# Patient Record
Sex: FEMALE | Race: ASIAN | NOT HISPANIC OR LATINO
[De-identification: names, ages, dates, MRNs, and addresses within clinical notes are randomized per-mention and may not be internally consistent; named-entity substitution may affect disease eponyms.]

---

## 2017-01-10 ENCOUNTER — APPOINTMENT (OUTPATIENT)
Dept: OBGYN | Facility: CLINIC | Age: 52
End: 2017-01-10

## 2017-01-10 VITALS
WEIGHT: 121 LBS | SYSTOLIC BLOOD PRESSURE: 146 MMHG | DIASTOLIC BLOOD PRESSURE: 98 MMHG | HEIGHT: 62 IN | BODY MASS INDEX: 22.26 KG/M2 | HEART RATE: 66 BPM

## 2017-01-19 LAB
CYTOLOGY CVX/VAG DOC THIN PREP: NORMAL
ESTRADIOL SERPL-MCNC: <5 PG/ML
FSH SERPL-MCNC: 58.6 IU/L
HPV HIGH+LOW RISK DNA PNL CVX: NEGATIVE
PROLACTIN SERPL-MCNC: 13.8 NG/ML
TSH SERPL-ACNC: 1.97 UIU/ML

## 2017-05-30 ENCOUNTER — APPOINTMENT (OUTPATIENT)
Dept: OBGYN | Facility: CLINIC | Age: 52
End: 2017-05-30

## 2017-05-30 VITALS
SYSTOLIC BLOOD PRESSURE: 159 MMHG | HEART RATE: 54 BPM | WEIGHT: 117.19 LBS | HEIGHT: 62 IN | BODY MASS INDEX: 21.57 KG/M2 | DIASTOLIC BLOOD PRESSURE: 90 MMHG

## 2017-05-31 LAB
ALBUMIN SERPL ELPH-MCNC: 4.6 G/DL
ALP BLD-CCNC: 93 U/L
ALT SERPL-CCNC: 16 U/L
ANION GAP SERPL CALC-SCNC: 15 MMOL/L
AST SERPL-CCNC: 24 U/L
BASOPHILS # BLD AUTO: 0.02 K/UL
BASOPHILS NFR BLD AUTO: 0.3 %
BILIRUB SERPL-MCNC: 0.4 MG/DL
BUN SERPL-MCNC: 11 MG/DL
CALCIUM SERPL-MCNC: 10 MG/DL
CHLORIDE SERPL-SCNC: 101 MMOL/L
CO2 SERPL-SCNC: 25 MMOL/L
CREAT SERPL-MCNC: 0.82 MG/DL
EOSINOPHIL # BLD AUTO: 0.19 K/UL
EOSINOPHIL NFR BLD AUTO: 3.3 %
GLUCOSE SERPL-MCNC: 71 MG/DL
HBA1C MFR BLD HPLC: 5.4 %
HCT VFR BLD CALC: 43.5 %
HGB BLD-MCNC: 14.2 G/DL
IMM GRANULOCYTES NFR BLD AUTO: 0.2 %
LYMPHOCYTES # BLD AUTO: 2.17 K/UL
LYMPHOCYTES NFR BLD AUTO: 37.7 %
MAN DIFF?: NORMAL
MCHC RBC-ENTMCNC: 29.8 PG
MCHC RBC-ENTMCNC: 32.6 GM/DL
MCV RBC AUTO: 91.2 FL
MONOCYTES # BLD AUTO: 0.33 K/UL
MONOCYTES NFR BLD AUTO: 5.7 %
NEUTROPHILS # BLD AUTO: 3.04 K/UL
NEUTROPHILS NFR BLD AUTO: 52.8 %
PLATELET # BLD AUTO: 281 K/UL
POTASSIUM SERPL-SCNC: 5.8 MMOL/L
PROT SERPL-MCNC: 7.6 G/DL
RBC # BLD: 4.77 M/UL
RBC # FLD: 12.6 %
SODIUM SERPL-SCNC: 141 MMOL/L
WBC # FLD AUTO: 5.76 K/UL

## 2017-06-07 ENCOUNTER — APPOINTMENT (OUTPATIENT)
Dept: ULTRASOUND IMAGING | Facility: IMAGING CENTER | Age: 52
End: 2017-06-07

## 2017-06-07 ENCOUNTER — APPOINTMENT (OUTPATIENT)
Dept: MAMMOGRAPHY | Facility: IMAGING CENTER | Age: 52
End: 2017-06-07

## 2017-06-07 ENCOUNTER — OUTPATIENT (OUTPATIENT)
Dept: OUTPATIENT SERVICES | Facility: HOSPITAL | Age: 52
LOS: 1 days | End: 2017-06-07
Payer: COMMERCIAL

## 2017-06-07 DIAGNOSIS — Z12.39 ENCOUNTER FOR OTHER SCREENING FOR MALIGNANT NEOPLASM OF BREAST: ICD-10-CM

## 2017-06-07 DIAGNOSIS — S62.102A FRACTURE OF UNSPECIFIED CARPAL BONE, LEFT WRIST, INITIAL ENCOUNTER FOR CLOSED FRACTURE: Chronic | ICD-10-CM

## 2017-06-07 PROCEDURE — 77067 SCR MAMMO BI INCL CAD: CPT

## 2017-06-07 PROCEDURE — 77063 BREAST TOMOSYNTHESIS BI: CPT

## 2017-06-07 PROCEDURE — 76641 ULTRASOUND BREAST COMPLETE: CPT

## 2018-01-23 ENCOUNTER — APPOINTMENT (OUTPATIENT)
Dept: OBGYN | Facility: CLINIC | Age: 53
End: 2018-01-23
Payer: COMMERCIAL

## 2018-01-23 VITALS
BODY MASS INDEX: 21.71 KG/M2 | DIASTOLIC BLOOD PRESSURE: 92 MMHG | HEIGHT: 62 IN | WEIGHT: 118 LBS | HEART RATE: 56 BPM | SYSTOLIC BLOOD PRESSURE: 149 MMHG

## 2018-01-23 DIAGNOSIS — Z87.42 PERSONAL HISTORY OF OTHER DISEASES OF THE FEMALE GENITAL TRACT: ICD-10-CM

## 2018-01-23 DIAGNOSIS — L60.3 NAIL DYSTROPHY: ICD-10-CM

## 2018-01-23 DIAGNOSIS — N95.1 MENOPAUSAL AND FEMALE CLIMACTERIC STATES: ICD-10-CM

## 2018-01-23 DIAGNOSIS — Z87.448 PERSONAL HISTORY OF OTHER DISEASES OF URINARY SYSTEM: ICD-10-CM

## 2018-01-23 DIAGNOSIS — K13.0 DISEASES OF LIPS: ICD-10-CM

## 2018-01-23 DIAGNOSIS — N32.81 OVERACTIVE BLADDER: ICD-10-CM

## 2018-01-23 DIAGNOSIS — N76.0 ACUTE VAGINITIS: ICD-10-CM

## 2018-01-23 DIAGNOSIS — B96.89 ACUTE VAGINITIS: ICD-10-CM

## 2018-01-23 DIAGNOSIS — N39.41 URGE INCONTINENCE: ICD-10-CM

## 2018-01-23 DIAGNOSIS — R93.8 ABNORMAL FINDINGS ON DIAGNOSTIC IMAGING OF OTHER SPECIFIED BODY STRUCTURES: ICD-10-CM

## 2018-01-23 DIAGNOSIS — R92.2 INCONCLUSIVE MAMMOGRAM: ICD-10-CM

## 2018-01-23 DIAGNOSIS — Z01.419 ENCOUNTER FOR GYNECOLOGICAL EXAMINATION (GENERAL) (ROUTINE) W/OUT ABNORMAL FINDINGS: ICD-10-CM

## 2018-01-23 DIAGNOSIS — T14.8XXA OTHER INJURY OF UNSPECIFIED BODY REGION, INITIAL ENCOUNTER: ICD-10-CM

## 2018-01-23 DIAGNOSIS — R36.0 URETHRAL DISCHARGE W/OUT BLOOD: ICD-10-CM

## 2018-01-23 DIAGNOSIS — M62.89 OTHER SPECIFIED DISORDERS OF MUSCLE: ICD-10-CM

## 2018-01-23 DIAGNOSIS — Z86.03 PERSONAL HISTORY OF NEOPLASM OF UNCERTAIN BEHAVIOR: ICD-10-CM

## 2018-01-23 PROCEDURE — 99396 PREV VISIT EST AGE 40-64: CPT

## 2018-01-25 LAB
CANDIDA VAG CYTO: NOT DETECTED
G VAGINALIS+PREV SP MTYP VAG QL MICRO: NOT DETECTED
T VAGINALIS VAG QL WET PREP: NOT DETECTED

## 2018-01-30 ENCOUNTER — OTHER (OUTPATIENT)
Age: 53
End: 2018-01-30

## 2018-03-20 ENCOUNTER — APPOINTMENT (OUTPATIENT)
Dept: GASTROENTEROLOGY | Facility: CLINIC | Age: 53
End: 2018-03-20

## 2018-04-03 ENCOUNTER — APPOINTMENT (OUTPATIENT)
Dept: OBGYN | Facility: CLINIC | Age: 53
End: 2018-04-03

## 2018-07-03 ENCOUNTER — APPOINTMENT (OUTPATIENT)
Dept: ULTRASOUND IMAGING | Facility: IMAGING CENTER | Age: 53
End: 2018-07-03
Payer: COMMERCIAL

## 2018-07-03 ENCOUNTER — APPOINTMENT (OUTPATIENT)
Dept: MAMMOGRAPHY | Facility: IMAGING CENTER | Age: 53
End: 2018-07-03
Payer: COMMERCIAL

## 2018-07-03 ENCOUNTER — OUTPATIENT (OUTPATIENT)
Dept: OUTPATIENT SERVICES | Facility: HOSPITAL | Age: 53
LOS: 1 days | End: 2018-07-03
Payer: COMMERCIAL

## 2018-07-03 DIAGNOSIS — R92.8 OTHER ABNORMAL AND INCONCLUSIVE FINDINGS ON DIAGNOSTIC IMAGING OF BREAST: ICD-10-CM

## 2018-07-03 DIAGNOSIS — R92.2 INCONCLUSIVE MAMMOGRAM: ICD-10-CM

## 2018-07-03 DIAGNOSIS — S62.102A FRACTURE OF UNSPECIFIED CARPAL BONE, LEFT WRIST, INITIAL ENCOUNTER FOR CLOSED FRACTURE: Chronic | ICD-10-CM

## 2018-07-03 DIAGNOSIS — Z12.31 ENCOUNTER FOR SCREENING MAMMOGRAM FOR MALIGNANT NEOPLASM OF BREAST: ICD-10-CM

## 2018-07-03 PROCEDURE — 77067 SCR MAMMO BI INCL CAD: CPT

## 2018-07-03 PROCEDURE — 76641 ULTRASOUND BREAST COMPLETE: CPT | Mod: 26,50

## 2018-07-03 PROCEDURE — 77063 BREAST TOMOSYNTHESIS BI: CPT | Mod: 26

## 2018-07-03 PROCEDURE — 77067 SCR MAMMO BI INCL CAD: CPT | Mod: 26

## 2018-07-03 PROCEDURE — 76641 ULTRASOUND BREAST COMPLETE: CPT

## 2018-07-03 PROCEDURE — 77063 BREAST TOMOSYNTHESIS BI: CPT

## 2018-10-04 ENCOUNTER — APPOINTMENT (OUTPATIENT)
Dept: OBGYN | Facility: CLINIC | Age: 53
End: 2018-10-04
Payer: COMMERCIAL

## 2018-10-04 VITALS
SYSTOLIC BLOOD PRESSURE: 131 MMHG | BODY MASS INDEX: 22.08 KG/M2 | WEIGHT: 120 LBS | DIASTOLIC BLOOD PRESSURE: 87 MMHG | HEART RATE: 61 BPM | HEIGHT: 62 IN

## 2018-10-04 DIAGNOSIS — N76.2 ACUTE VULVITIS: ICD-10-CM

## 2018-10-04 PROCEDURE — 99213 OFFICE O/P EST LOW 20 MIN: CPT

## 2018-10-08 ENCOUNTER — APPOINTMENT (OUTPATIENT)
Dept: RADIOLOGY | Facility: IMAGING CENTER | Age: 53
End: 2018-10-08

## 2018-10-11 ENCOUNTER — APPOINTMENT (OUTPATIENT)
Dept: NEUROLOGY | Facility: CLINIC | Age: 53
End: 2018-10-11

## 2018-10-16 ENCOUNTER — APPOINTMENT (OUTPATIENT)
Dept: PULMONOLOGY | Facility: CLINIC | Age: 53
End: 2018-10-16

## 2018-10-22 ENCOUNTER — APPOINTMENT (OUTPATIENT)
Dept: PULMONOLOGY | Facility: CLINIC | Age: 53
End: 2018-10-22

## 2018-10-30 ENCOUNTER — OUTPATIENT (OUTPATIENT)
Dept: OUTPATIENT SERVICES | Facility: HOSPITAL | Age: 53
LOS: 1 days | End: 2018-10-30

## 2018-10-30 DIAGNOSIS — S62.102A FRACTURE OF UNSPECIFIED CARPAL BONE, LEFT WRIST, INITIAL ENCOUNTER FOR CLOSED FRACTURE: Chronic | ICD-10-CM

## 2018-10-31 ENCOUNTER — APPOINTMENT (OUTPATIENT)
Dept: MAMMOGRAPHY | Facility: CLINIC | Age: 53
End: 2018-10-31
Payer: COMMERCIAL

## 2018-10-31 ENCOUNTER — APPOINTMENT (OUTPATIENT)
Dept: ULTRASOUND IMAGING | Facility: CLINIC | Age: 53
End: 2018-10-31
Payer: COMMERCIAL

## 2018-10-31 ENCOUNTER — APPOINTMENT (OUTPATIENT)
Dept: RADIOLOGY | Facility: CLINIC | Age: 53
End: 2018-10-31
Payer: COMMERCIAL

## 2018-10-31 ENCOUNTER — OUTPATIENT (OUTPATIENT)
Dept: OUTPATIENT SERVICES | Facility: HOSPITAL | Age: 53
LOS: 1 days | End: 2018-10-31

## 2018-10-31 DIAGNOSIS — S62.102A FRACTURE OF UNSPECIFIED CARPAL BONE, LEFT WRIST, INITIAL ENCOUNTER FOR CLOSED FRACTURE: Chronic | ICD-10-CM

## 2018-10-31 PROCEDURE — 77065 DX MAMMO INCL CAD UNI: CPT | Mod: 26,LT

## 2018-10-31 PROCEDURE — 76641 ULTRASOUND BREAST COMPLETE: CPT | Mod: 26,LT

## 2018-10-31 PROCEDURE — 71048 X-RAY EXAM CHEST 4+ VIEWS: CPT | Mod: 26

## 2018-10-31 PROCEDURE — G0279: CPT | Mod: 26

## 2018-11-02 ENCOUNTER — APPOINTMENT (OUTPATIENT)
Dept: OBGYN | Facility: CLINIC | Age: 53
End: 2018-11-02
Payer: COMMERCIAL

## 2018-11-02 VITALS
DIASTOLIC BLOOD PRESSURE: 81 MMHG | WEIGHT: 122.38 LBS | HEART RATE: 55 BPM | SYSTOLIC BLOOD PRESSURE: 114 MMHG | BODY MASS INDEX: 22.52 KG/M2 | HEIGHT: 62 IN

## 2018-11-02 DIAGNOSIS — R92.8 OTHER ABNORMAL AND INCONCLUSIVE FINDINGS ON DIAGNOSTIC IMAGING OF BREAST: ICD-10-CM

## 2018-11-02 DIAGNOSIS — Z87.828 PERSONAL HISTORY OF OTHER (HEALED) PHYSICAL INJURY AND TRAUMA: ICD-10-CM

## 2018-11-02 PROCEDURE — 99213 OFFICE O/P EST LOW 20 MIN: CPT

## 2018-11-06 ENCOUNTER — APPOINTMENT (OUTPATIENT)
Dept: ULTRASOUND IMAGING | Facility: CLINIC | Age: 53
End: 2018-11-06
Payer: COMMERCIAL

## 2018-11-06 ENCOUNTER — RESULT REVIEW (OUTPATIENT)
Age: 53
End: 2018-11-06

## 2018-11-06 ENCOUNTER — OUTPATIENT (OUTPATIENT)
Dept: OUTPATIENT SERVICES | Facility: HOSPITAL | Age: 53
LOS: 1 days | End: 2018-11-06

## 2018-11-06 DIAGNOSIS — S62.102A FRACTURE OF UNSPECIFIED CARPAL BONE, LEFT WRIST, INITIAL ENCOUNTER FOR CLOSED FRACTURE: Chronic | ICD-10-CM

## 2018-11-06 PROCEDURE — 19083 BX BREAST 1ST LESION US IMAG: CPT | Mod: LT

## 2018-11-06 PROCEDURE — 77065 DX MAMMO INCL CAD UNI: CPT | Mod: 26,LT

## 2018-11-07 LAB — SURGICAL PATHOLOGY STUDY: SIGNIFICANT CHANGE UP

## 2018-11-15 ENCOUNTER — APPOINTMENT (OUTPATIENT)
Dept: BREAST CENTER | Facility: CLINIC | Age: 53
End: 2018-11-15
Payer: COMMERCIAL

## 2018-11-15 VITALS
WEIGHT: 122 LBS | HEART RATE: 64 BPM | TEMPERATURE: 98.4 F | HEIGHT: 62 IN | SYSTOLIC BLOOD PRESSURE: 120 MMHG | BODY MASS INDEX: 22.45 KG/M2 | OXYGEN SATURATION: 95 % | DIASTOLIC BLOOD PRESSURE: 81 MMHG

## 2018-11-15 DIAGNOSIS — D24.2 BENIGN NEOPLASM OF LEFT BREAST: ICD-10-CM

## 2018-11-15 PROCEDURE — 99243 OFF/OP CNSLTJ NEW/EST LOW 30: CPT

## 2019-01-02 ENCOUNTER — FORM ENCOUNTER (OUTPATIENT)
Age: 54
End: 2019-01-02

## 2019-01-02 PROBLEM — Z87.828 HISTORY OF TRAUMA OF BREAST: Status: ACTIVE | Noted: 2018-10-04

## 2019-01-03 ENCOUNTER — APPOINTMENT (OUTPATIENT)
Dept: ULTRASOUND IMAGING | Facility: CLINIC | Age: 54
End: 2019-01-03
Payer: COMMERCIAL

## 2019-01-03 ENCOUNTER — OUTPATIENT (OUTPATIENT)
Dept: OUTPATIENT SERVICES | Facility: HOSPITAL | Age: 54
LOS: 1 days | End: 2019-01-03

## 2019-01-03 ENCOUNTER — APPOINTMENT (OUTPATIENT)
Dept: MAMMOGRAPHY | Facility: CLINIC | Age: 54
End: 2019-01-03
Payer: COMMERCIAL

## 2019-01-03 ENCOUNTER — RESULT REVIEW (OUTPATIENT)
Age: 54
End: 2019-01-03

## 2019-01-03 DIAGNOSIS — S62.102A FRACTURE OF UNSPECIFIED CARPAL BONE, LEFT WRIST, INITIAL ENCOUNTER FOR CLOSED FRACTURE: Chronic | ICD-10-CM

## 2019-01-03 PROCEDURE — 19285 PERQ DEV BREAST 1ST US IMAG: CPT | Mod: LT

## 2019-01-03 PROCEDURE — 77065 DX MAMMO INCL CAD UNI: CPT | Mod: 26,LT

## 2019-01-03 PROCEDURE — 76098 X-RAY EXAM SURGICAL SPECIMEN: CPT | Mod: 26

## 2019-01-08 LAB — SURGICAL PATHOLOGY STUDY: SIGNIFICANT CHANGE UP

## 2019-01-09 ENCOUNTER — CHART COPY (OUTPATIENT)
Age: 54
End: 2019-01-09

## 2019-01-11 ENCOUNTER — APPOINTMENT (OUTPATIENT)
Dept: BREAST CENTER | Facility: CLINIC | Age: 54
End: 2019-01-11
Payer: COMMERCIAL

## 2019-01-11 VITALS
DIASTOLIC BLOOD PRESSURE: 68 MMHG | BODY MASS INDEX: 22.45 KG/M2 | HEIGHT: 62 IN | SYSTOLIC BLOOD PRESSURE: 115 MMHG | WEIGHT: 122 LBS | HEART RATE: 60 BPM

## 2019-01-11 PROCEDURE — 99024 POSTOP FOLLOW-UP VISIT: CPT

## 2019-01-11 NOTE — HISTORY OF PRESENT ILLNESS
[FreeTextEntry1] : Patient presents for post op she is s/p left wide excision for sclerosing intraductal papilloma on 1/3/19 final pathology revealed LCIS, surrounding tissue with columnar cell changes with and without atypia, atpical lobular hyperplasia, UDH, sclerosing adenosis and apocrine metaplasia, biopsy site changes, calcs associated with benign epithelium. She is doing well, incision C/D/I.

## 2019-01-11 NOTE — CONSULT LETTER
[Dear  ___] : Dear  [unfilled], [Consult Letter:] : I had the pleasure of evaluating your patient, [unfilled]. [Please see my note below.] : Please see my note below. [Consult Closing:] : Thank you very much for allowing me to participate in the care of this patient.  If you have any questions, please do not hesitate to contact me. [Sincerely,] : Sincerely, [FreeTextEntry2] : Andrew Palma MD [FreeTextEntry3] : Jeanette Heller MD FACS\par

## 2019-07-08 ENCOUNTER — APPOINTMENT (OUTPATIENT)
Dept: MAMMOGRAPHY | Facility: CLINIC | Age: 54
End: 2019-07-08
Payer: COMMERCIAL

## 2019-07-08 ENCOUNTER — APPOINTMENT (OUTPATIENT)
Dept: ULTRASOUND IMAGING | Facility: CLINIC | Age: 54
End: 2019-07-08

## 2019-07-08 ENCOUNTER — OUTPATIENT (OUTPATIENT)
Dept: OUTPATIENT SERVICES | Facility: HOSPITAL | Age: 54
LOS: 1 days | End: 2019-07-08

## 2019-07-08 DIAGNOSIS — S62.102A FRACTURE OF UNSPECIFIED CARPAL BONE, LEFT WRIST, INITIAL ENCOUNTER FOR CLOSED FRACTURE: Chronic | ICD-10-CM

## 2019-07-08 PROCEDURE — 77063 BREAST TOMOSYNTHESIS BI: CPT | Mod: 26

## 2019-07-08 PROCEDURE — 77067 SCR MAMMO BI INCL CAD: CPT | Mod: 26

## 2019-07-08 PROCEDURE — 76641 ULTRASOUND BREAST COMPLETE: CPT | Mod: 26,50

## 2019-07-25 ENCOUNTER — APPOINTMENT (OUTPATIENT)
Dept: SURGERY | Facility: CLINIC | Age: 54
End: 2019-07-25
Payer: COMMERCIAL

## 2019-07-25 VITALS
DIASTOLIC BLOOD PRESSURE: 66 MMHG | BODY MASS INDEX: 21.53 KG/M2 | WEIGHT: 117 LBS | SYSTOLIC BLOOD PRESSURE: 113 MMHG | HEART RATE: 56 BPM | HEIGHT: 62 IN

## 2019-07-25 PROCEDURE — 99213 OFFICE O/P EST LOW 20 MIN: CPT

## 2019-07-25 PROCEDURE — 99203 OFFICE O/P NEW LOW 30 MIN: CPT

## 2019-07-25 RX ORDER — TRIAMCINOLONE ACETONIDE 1 MG/G
0.1 CREAM TOPICAL TWICE DAILY
Qty: 1 | Refills: 0 | Status: DISCONTINUED | COMMUNITY
Start: 2018-01-23 | End: 2019-07-25

## 2019-07-26 NOTE — PAST MEDICAL HISTORY
[Postmenopausal] : The patient is postmenopausal [Menarche Age ____] : age at menarche was [unfilled] [Menopause Age____] : age at menopause was [unfilled] [Approximately ___] : the LMP was approximately [unfilled] [Total Preg ___] : G[unfilled] [Live Births ___] : P[unfilled]  [Living ___] : Living: [unfilled] [Age At Live Birth ___] : Age at live birth: [unfilled] [AB Spont ___] : miscarriages: [unfilled]  [History of Hormone Replacement Treatment] : has no history of hormone replacement treatment [FreeTextEntry5] :  1995\par Hysteroscopy w/ resection for intrauterine polyp removal\par Tubal Ligation-  [FreeTextEntry6] : None [FreeTextEntry7] : Depo x 2 years, stopped in 1998 [FreeTextEntry8] : Breasfed x 5 months with each child

## 2019-07-26 NOTE — PHYSICAL EXAM
[Normocephalic] : normocephalic [Atraumatic] : atraumatic [Supple] : supple [No Supraclavicular Adenopathy] : no supraclavicular adenopathy [No dominant masses] : no dominant masses in right breast  [No dominant masses] : no dominant masses left breast [Examined in the supine and seated position] : examined in the supine and seated position [No Nipple Retraction] : no right nipple retraction [No Nipple Discharge] : no left nipple discharge [No Axillary Lymphadenopathy] : no left axillary lymphadenopathy [No Edema] : no edema [No Rashes] : no rashes [No Ulceration] : no ulceration

## 2019-07-26 NOTE — HISTORY OF PRESENT ILLNESS
[FreeTextEntry1] : Samuel is a 54 year old female previously under the care of Dr. Jeanette Heller presents for follow up. She was last evaluated in the office in 1/2019. She has h/o left breast LCIS and is s/p left excisional biopsy on 1/3/19. \par Pt denies masses, lesions, discharge, or other breast complaints. \par \par Pt did not see med onc for risk reduction therapy, because she said she did not know who to go to. Reassured patient that she will be referred to a medical oncologist today. Discussed the patient's high risk status due to history of LCIS. MILENA 49%. Also, discussed benefit of MRI and close monitoring for her high risk status. Patient understood and is willing to go forward with both. Patient understands that due to her family history of ovarian cancer she would also benefit from genetic testing as she has children and siblings.\par \par On 7/8/19 she completed her b/l mammo/US showing no mammographic or sonographic evidence of malignancy, BIRADS 2 benign findings. Has not had an MRI \par \par Family history is positive for ovarian cancer (m-aunt dx 50); she reports that her sister "might" have breast cancer, but is unsure; she knows that her sister has had multiple surgeries on her breasts, but her sister has not been forthcoming about what the actual diagnosis is. No genetic testing has been completed.

## 2019-08-15 ENCOUNTER — APPOINTMENT (OUTPATIENT)
Dept: MRI IMAGING | Facility: HOSPITAL | Age: 54
End: 2019-08-15
Payer: COMMERCIAL

## 2019-08-15 ENCOUNTER — OUTPATIENT (OUTPATIENT)
Dept: OUTPATIENT SERVICES | Facility: HOSPITAL | Age: 54
LOS: 1 days | End: 2019-08-15
Payer: COMMERCIAL

## 2019-08-15 DIAGNOSIS — S62.102A FRACTURE OF UNSPECIFIED CARPAL BONE, LEFT WRIST, INITIAL ENCOUNTER FOR CLOSED FRACTURE: Chronic | ICD-10-CM

## 2019-08-15 PROCEDURE — C8937: CPT

## 2019-08-15 PROCEDURE — 77049 MRI BREAST C-+ W/CAD BI: CPT | Mod: 26

## 2019-08-15 PROCEDURE — A9585: CPT

## 2019-08-15 PROCEDURE — 77049 MRI BREAST C-+ W/CAD BI: CPT

## 2019-08-22 DIAGNOSIS — N63.10 UNSPECIFIED LUMP IN THE RIGHT BREAST, UNSPECIFIED QUADRANT: ICD-10-CM

## 2019-08-22 DIAGNOSIS — R92.8 OTHER ABNORMAL AND INCONCLUSIVE FINDINGS ON DIAGNOSTIC IMAGING OF BREAST: ICD-10-CM

## 2019-08-23 ENCOUNTER — OUTPATIENT (OUTPATIENT)
Dept: OUTPATIENT SERVICES | Facility: HOSPITAL | Age: 54
LOS: 1 days | End: 2019-08-23
Payer: COMMERCIAL

## 2019-08-23 DIAGNOSIS — S62.102A FRACTURE OF UNSPECIFIED CARPAL BONE, LEFT WRIST, INITIAL ENCOUNTER FOR CLOSED FRACTURE: Chronic | ICD-10-CM

## 2019-08-23 PROCEDURE — 76642 ULTRASOUND BREAST LIMITED: CPT

## 2019-08-23 PROCEDURE — 76642 ULTRASOUND BREAST LIMITED: CPT | Mod: 26,RT

## 2019-08-27 ENCOUNTER — CHART COPY (OUTPATIENT)
Age: 54
End: 2019-08-27

## 2019-09-20 ENCOUNTER — RESULT REVIEW (OUTPATIENT)
Age: 54
End: 2019-09-20

## 2019-09-20 ENCOUNTER — OUTPATIENT (OUTPATIENT)
Dept: OUTPATIENT SERVICES | Facility: HOSPITAL | Age: 54
LOS: 1 days | End: 2019-09-20
Payer: COMMERCIAL

## 2019-09-20 ENCOUNTER — APPOINTMENT (OUTPATIENT)
Dept: ULTRASOUND IMAGING | Facility: HOSPITAL | Age: 54
End: 2019-09-20
Payer: COMMERCIAL

## 2019-09-20 DIAGNOSIS — S62.102A FRACTURE OF UNSPECIFIED CARPAL BONE, LEFT WRIST, INITIAL ENCOUNTER FOR CLOSED FRACTURE: Chronic | ICD-10-CM

## 2019-09-20 PROCEDURE — 88305 TISSUE EXAM BY PATHOLOGIST: CPT

## 2019-09-20 PROCEDURE — 19083 BX BREAST 1ST LESION US IMAG: CPT

## 2019-09-20 PROCEDURE — A4648: CPT

## 2019-09-20 PROCEDURE — 77065 DX MAMMO INCL CAD UNI: CPT | Mod: 26,RT

## 2019-09-20 PROCEDURE — 19083 BX BREAST 1ST LESION US IMAG: CPT | Mod: RT

## 2019-09-20 PROCEDURE — 88341 IMHCHEM/IMCYTCHM EA ADD ANTB: CPT

## 2019-09-20 PROCEDURE — 77065 DX MAMMO INCL CAD UNI: CPT

## 2019-09-24 LAB
SURGICAL PATHOLOGY STUDY: SIGNIFICANT CHANGE UP
SURGICAL PATHOLOGY STUDY: SIGNIFICANT CHANGE UP

## 2020-01-22 ENCOUNTER — RESULT REVIEW (OUTPATIENT)
Age: 55
End: 2020-01-22

## 2020-01-23 ENCOUNTER — APPOINTMENT (OUTPATIENT)
Dept: SURGERY | Facility: CLINIC | Age: 55
End: 2020-01-23
Payer: COMMERCIAL

## 2020-01-23 VITALS
WEIGHT: 119 LBS | SYSTOLIC BLOOD PRESSURE: 98 MMHG | HEIGHT: 62 IN | BODY MASS INDEX: 21.9 KG/M2 | TEMPERATURE: 98.9 F | HEART RATE: 64 BPM | DIASTOLIC BLOOD PRESSURE: 60 MMHG | OXYGEN SATURATION: 98 %

## 2020-01-23 PROCEDURE — 99214 OFFICE O/P EST MOD 30 MIN: CPT

## 2020-01-27 NOTE — HISTORY OF PRESENT ILLNESS
[FreeTextEntry1] : Samuel is a 54 year old post-menopausal female with a h/o left breast LCIS and is s/p left excisional biopsy on 1/3/19. MILENA of 49% Given her strong family h/o maternal aunt with ovarian cancer and sister with probable breast cancer, she underwent genetic testing results showing a VUS in CHECK2 and PTCH1. \par \par \par She followed up with Dr. Villasenor to discuss risk reduction therapy and it was decided that AI therapy is not indicated. \par \par \par

## 2020-01-27 NOTE — REASON FOR VISIT
[Follow-Up: _____] : a [unfilled] follow-up visit [FreeTextEntry1] : h/o left breast LCIS and is s/p left excisional biopsy on 1/3/19. MILENA of 49%

## 2020-01-27 NOTE — DATA REVIEWED
[FreeTextEntry1] : --7/8/19 b/l mammo/US: no mammographic or sonographic evidence of malignancy, BIRADS-2 benign findings. \par \par --8/15/19 ([facility]) MRI breasts: dense breast tissue. Right breast, 0.5 cm enhancing lesion at 1n2.7, likely benign but not seen on prior imaging so targeted US with biopsy is recommended, vs MRI biopsy. BI-RADS 4. Left breast benign MRI findings. Chest: 1.3 cm area in the right middle lobe, follow-up CT scan may be indicated. \par \par --9/20/19 ([facility]) biopsy: right breast 1n2.7, benign breast tissue. Concordant, f/u MRI is recommended in one year. 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 \par

## 2020-07-06 ENCOUNTER — OUTPATIENT (OUTPATIENT)
Dept: OUTPATIENT SERVICES | Facility: HOSPITAL | Age: 55
LOS: 1 days | End: 2020-07-06

## 2020-07-06 ENCOUNTER — APPOINTMENT (OUTPATIENT)
Dept: MAMMOGRAPHY | Facility: CLINIC | Age: 55
End: 2020-07-06
Payer: COMMERCIAL

## 2020-07-06 ENCOUNTER — APPOINTMENT (OUTPATIENT)
Dept: ULTRASOUND IMAGING | Facility: CLINIC | Age: 55
End: 2020-07-06

## 2020-07-06 DIAGNOSIS — S62.102A FRACTURE OF UNSPECIFIED CARPAL BONE, LEFT WRIST, INITIAL ENCOUNTER FOR CLOSED FRACTURE: Chronic | ICD-10-CM

## 2020-07-06 PROCEDURE — 77067 SCR MAMMO BI INCL CAD: CPT | Mod: 26

## 2020-07-06 PROCEDURE — 77063 BREAST TOMOSYNTHESIS BI: CPT | Mod: 26

## 2020-08-28 ENCOUNTER — APPOINTMENT (OUTPATIENT)
Dept: BREAST CENTER | Facility: CLINIC | Age: 55
End: 2020-08-28

## 2020-09-24 ENCOUNTER — OUTPATIENT (OUTPATIENT)
Dept: OUTPATIENT SERVICES | Facility: HOSPITAL | Age: 55
LOS: 1 days | End: 2020-09-24
Payer: COMMERCIAL

## 2020-09-24 ENCOUNTER — APPOINTMENT (OUTPATIENT)
Dept: CT IMAGING | Facility: IMAGING CENTER | Age: 55
End: 2020-09-24
Payer: COMMERCIAL

## 2020-09-24 DIAGNOSIS — Z00.8 ENCOUNTER FOR OTHER GENERAL EXAMINATION: ICD-10-CM

## 2020-09-24 DIAGNOSIS — S62.102A FRACTURE OF UNSPECIFIED CARPAL BONE, LEFT WRIST, INITIAL ENCOUNTER FOR CLOSED FRACTURE: Chronic | ICD-10-CM

## 2020-09-24 PROCEDURE — 71250 CT THORAX DX C-: CPT

## 2020-09-24 PROCEDURE — 71250 CT THORAX DX C-: CPT | Mod: 26

## 2020-09-25 ENCOUNTER — APPOINTMENT (OUTPATIENT)
Dept: BREAST CENTER | Facility: CLINIC | Age: 55
End: 2020-09-25
Payer: COMMERCIAL

## 2020-09-25 VITALS — WEIGHT: 119 LBS | OXYGEN SATURATION: 98 % | HEIGHT: 62 IN | HEART RATE: 67 BPM | BODY MASS INDEX: 21.9 KG/M2

## 2020-09-25 PROCEDURE — 99214 OFFICE O/P EST MOD 30 MIN: CPT

## 2020-09-25 NOTE — HISTORY OF PRESENT ILLNESS
[FreeTextEntry1] : Samuel is a 55 year old post-menopausal female with a h/o left breast LCIS and is s/p left excisional biopsy on 1/3/19. MILENA of 49%. 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 \par \par Today she c/o occasional "heaviness" in her left breast. Denies any palpable abnormalities, no skin changes, no nipple discharge bilaterally. She also reports intermittent hot flashes that occur at bedtime. DIscussed that given her high risk status, a low dose antidepressant may be warranted to treat her symptoms, advised Samuel to follow up with Dr. Palma (OB/GYN).\par \par She followed up with Dr. Villasenor to discuss risk reduction therapy and it was decided that AI therapy is not indicated.

## 2020-09-25 NOTE — PHYSICAL EXAM
[Supple] : supple [No Supraclavicular Adenopathy] : no supraclavicular adenopathy [No Thyromegaly] : no thyromegaly [Examined in the supine and seated position] : examined in the supine and seated position [Asymmetrical] : asymmetrical [No dominant masses] : no dominant masses in right breast  [No dominant masses] : no dominant masses left breast [No Nipple Retraction] : no left nipple retraction [No Nipple Discharge] : no left nipple discharge [No Axillary Lymphadenopathy] : no left axillary lymphadenopathy [de-identified] : circumareolar incision, well healed

## 2020-09-25 NOTE — PAST MEDICAL HISTORY
[Postmenopausal] : The patient is postmenopausal [Menarche Age ____] : age at menarche was [unfilled] [Menopause Age____] : age at menopause was [unfilled] [Approximately ___] : the LMP was approximately [unfilled] [Total Preg ___] : G[unfilled] [Live Births ___] : P[unfilled]  [Living ___] : Living: [unfilled] [AB Spont ___] : miscarriages: [unfilled]  [Age At Live Birth ___] : Age at live birth: [unfilled] [History of Hormone Replacement Treatment] : has no history of hormone replacement treatment [FreeTextEntry5] :  1995\par Hysteroscopy w/ resection for intrauterine polyp removal\par Tubal Ligation-  [FreeTextEntry6] : None [FreeTextEntry7] : Depo x 2 years, stopped in 1998 [FreeTextEntry8] : Breasfed x 5 months with each child

## 2020-09-25 NOTE — DATA REVIEWED
[FreeTextEntry1] : --7/8/19 b/l mammo/US: no mammographic or sonographic evidence of malignancy, BIRADS-2 benign findings. \par \par --8/15/19 ([facility]) MRI breasts: dense breast tissue. Right breast, 0.5 cm enhancing lesion at 1n2.7, likely benign but not seen on prior imaging so targeted US with biopsy is recommended, vs MRI biopsy. BI-RADS 4. Left breast benign MRI findings. Chest: 1.3 cm area in the right middle lobe, follow-up CT scan may be indicated. \par \par --9/20/19 ([facility]) biopsy: right breast 1n2.7, benign breast tissue. Concordant, f/u MRI is recommended in one year. \par \par --7/25/2019 ([facility]) Genetic testing yielded a VUS in CHECK2 and PTCH1. \par \par -- 7/6/2020 ([facility]) b/l mmg: heterogenously dense breasts (of note-- previous was denoted scattered areas of fibroglandular density). No suspicious findings. BI-RADS 2. \par  \par

## 2020-09-25 NOTE — ASSESSMENT
[FreeTextEntry1] : MILES is a 55 year female with a strong family history of breast cancer, lifetime MILENA risk of 49% and personal history of left LCIS s/p excisional biopsy in 2019. Discussed with the patient the implications for her lifetime risk, which is considered to be at high risk to develop breast cancer over the span of her lifetime and it is recommended that she undergoes high risk screening surveillance to include biannual radiological screening exams with a mammogram and screening MRI. \par \par VUS in CHECK2 and PTCH1. \par \par Discussed the standard recommendation to limit alcohol intake, follow a low-fat diet, avoid smoking. Reviewed prevention options from lifestyle, radiological surveillance, anti-estrogen maintenance, and prophylactic mastectomy.\par \par Also discussed potential benefit of going on Effexor for hot flashes.  Patient scheduled to see Dr. Palma next month.  Will ask her for her thoughts on this. \par \par All patient questions were answered; she verbalized understanding of the information and plan of care.\par

## 2020-09-25 NOTE — REASON FOR VISIT
[Consultation] : a consultation visit [FreeTextEntry1] : h/o left breast LCIS and is s/p left excisional biopsy on 1/3/19. MILENA of 49%.

## 2020-10-05 ENCOUNTER — APPOINTMENT (OUTPATIENT)
Dept: PULMONOLOGY | Facility: CLINIC | Age: 55
End: 2020-10-05

## 2020-10-08 DIAGNOSIS — Z83.3 FAMILY HISTORY OF DIABETES MELLITUS: ICD-10-CM

## 2020-10-08 DIAGNOSIS — Z80.49 FAMILY HISTORY OF MALIGNANT NEOPLASM OF OTHER GENITAL ORGANS: ICD-10-CM

## 2020-10-08 DIAGNOSIS — Z12.39 ENCOUNTER FOR OTHER SCREENING FOR MALIGNANT NEOPLASM OF BREAST: ICD-10-CM

## 2020-10-08 DIAGNOSIS — Z82.5 FAMILY HISTORY OF ASTHMA AND OTHER CHRONIC LOWER RESPIRATORY DISEASES: ICD-10-CM

## 2020-10-08 DIAGNOSIS — Z82.49 FAMILY HISTORY OF ISCHEMIC HEART DISEASE AND OTHER DISEASES OF THE CIRCULATORY SYSTEM: ICD-10-CM

## 2020-10-09 ENCOUNTER — APPOINTMENT (OUTPATIENT)
Dept: INTERNAL MEDICINE | Facility: CLINIC | Age: 55
End: 2020-10-09
Payer: COMMERCIAL

## 2020-10-09 ENCOUNTER — NON-APPOINTMENT (OUTPATIENT)
Age: 55
End: 2020-10-09

## 2020-10-09 VITALS
SYSTOLIC BLOOD PRESSURE: 130 MMHG | HEIGHT: 63 IN | HEART RATE: 64 BPM | TEMPERATURE: 97.7 F | OXYGEN SATURATION: 100 % | DIASTOLIC BLOOD PRESSURE: 76 MMHG | WEIGHT: 123 LBS | BODY MASS INDEX: 21.79 KG/M2

## 2020-10-09 DIAGNOSIS — Z83.3 FAMILY HISTORY OF DIABETES MELLITUS: ICD-10-CM

## 2020-10-09 DIAGNOSIS — Z81.8 FAMILY HISTORY OF OTHER MENTAL AND BEHAVIORAL DISORDERS: ICD-10-CM

## 2020-10-09 DIAGNOSIS — Z83.49 FAMILY HISTORY OF OTHER ENDOCRINE, NUTRITIONAL AND METABOLIC DISEASES: ICD-10-CM

## 2020-10-09 DIAGNOSIS — Z80.3 FAMILY HISTORY OF MALIGNANT NEOPLASM OF BREAST: ICD-10-CM

## 2020-10-09 DIAGNOSIS — Z80.41 FAMILY HISTORY OF MALIGNANT NEOPLASM OF OVARY: ICD-10-CM

## 2020-10-09 PROCEDURE — 36415 COLL VENOUS BLD VENIPUNCTURE: CPT

## 2020-10-09 PROCEDURE — 99386 PREV VISIT NEW AGE 40-64: CPT | Mod: 25

## 2020-10-09 PROCEDURE — 93000 ELECTROCARDIOGRAM COMPLETE: CPT | Mod: 59

## 2020-10-09 PROCEDURE — G0444 DEPRESSION SCREEN ANNUAL: CPT

## 2020-10-09 NOTE — REVIEW OF SYSTEMS
[Negative] : Heme/Lymph [Nocturia] : nocturia [Frequency] : frequency [Fever] : no fever [Vision Problems] : no vision problems [Hearing Loss] : no hearing loss [Dysuria] : no dysuria [Vaginal Discharge] : no vaginal discharge

## 2020-10-09 NOTE — HEALTH RISK ASSESSMENT
[No] : No [No falls in past year] : Patient reported no falls in the past year [0] : 2) Feeling down, depressed, or hopeless: Not at all (0) [No Retinopathy] : No retinopathy [Patient reported mammogram was normal] : Patient reported mammogram was normal [Patient reported PAP Smear was normal] : Patient reported PAP Smear was normal [Patient reported colonoscopy was normal] : Patient reported colonoscopy was normal [HIV test declined] : HIV test declined [Hepatitis C test declined] : Hepatitis C test declined [With Family] : lives with family [Employed] : employed [] :  [Sexually Active] : sexually active [With Patient/Caregiver] : With Patient/Caregiver [] : No [EyeExamDate] : 1/1/2017 [Reports changes in hearing] : Reports no changes in hearing [Reports changes in vision] : Reports no changes in vision [Reports changes in dental health] : Reports no changes in dental health [MammogramDate] : 7/1/2020 [PapSmearDate] : 1/1/2018 [ColonoscopyDate] : 1/1/2018  [de-identified] :  [AdvancecareDate] : 10/9/2020

## 2020-10-09 NOTE — HISTORY OF PRESENT ILLNESS
[FreeTextEntry1] : 1. Pt presents to establish Primary Care and is requesting an Annual Physical\par \par

## 2020-10-09 NOTE — PHYSICAL EXAM
[No Acute Distress] : no acute distress [Normal Sclera/Conjunctiva] : normal sclera/conjunctiva [Normal Oropharynx] : the oropharynx was normal [No Lymphadenopathy] : no lymphadenopathy [Clear to Auscultation] : lungs were clear to auscultation bilaterally [Normal Rate] : normal rate  [Regular Rhythm] : with a regular rhythm [Pedal Pulses Present] : the pedal pulses are present [No Edema] : there was no peripheral edema [Declined Breast Exam] : declined breast exam  [Soft] : abdomen soft [Non Tender] : non-tender [No CVA Tenderness] : no CVA  tenderness [No Rash] : no rash [Normal] : affect was normal and insight and judgment were intact [de-identified] : Thyromegaly

## 2020-10-11 LAB
25(OH)D3 SERPL-MCNC: 35.4 NG/ML
ALBUMIN SERPL ELPH-MCNC: 5 G/DL
ALP BLD-CCNC: 102 U/L
ALT SERPL-CCNC: 20 U/L
ANION GAP SERPL CALC-SCNC: 19 MMOL/L
APPEARANCE: CLEAR
AST SERPL-CCNC: 31 U/L
BACTERIA: NEGATIVE
BASOPHILS # BLD AUTO: 0.03 K/UL
BASOPHILS NFR BLD AUTO: 0.7 %
BILIRUB SERPL-MCNC: 0.6 MG/DL
BILIRUBIN URINE: NEGATIVE
BLOOD URINE: NEGATIVE
BUN SERPL-MCNC: 8 MG/DL
CALCIUM SERPL-MCNC: 10 MG/DL
CHLORIDE SERPL-SCNC: 102 MMOL/L
CHOLEST SERPL-MCNC: 193 MG/DL
CHOLEST/HDLC SERPL: 3.1 RATIO
CO2 SERPL-SCNC: 24 MMOL/L
COLOR: NORMAL
CREAT SERPL-MCNC: 0.79 MG/DL
EOSINOPHIL # BLD AUTO: 0.13 K/UL
EOSINOPHIL NFR BLD AUTO: 2.9 %
ESTIMATED AVERAGE GLUCOSE: 100 MG/DL
GLUCOSE QUALITATIVE U: NEGATIVE
GLUCOSE SERPL-MCNC: 102 MG/DL
HBA1C MFR BLD HPLC: 5.1 %
HCT VFR BLD CALC: 43.1 %
HCV AB SER QL: NONREACTIVE
HCV S/CO RATIO: 0.08 S/CO
HDLC SERPL-MCNC: 61 MG/DL
HGB BLD-MCNC: 14.2 G/DL
HIV1+2 AB SPEC QL IA.RAPID: NONREACTIVE
HYALINE CASTS: 0 /LPF
IMM GRANULOCYTES NFR BLD AUTO: 0.2 %
KETONES URINE: NEGATIVE
LDLC SERPL CALC-MCNC: 118 MG/DL
LEUKOCYTE ESTERASE URINE: NEGATIVE
LYMPHOCYTES # BLD AUTO: 1.58 K/UL
LYMPHOCYTES NFR BLD AUTO: 35 %
MAN DIFF?: NORMAL
MCHC RBC-ENTMCNC: 31 PG
MCHC RBC-ENTMCNC: 32.9 GM/DL
MCV RBC AUTO: 94.1 FL
MICROSCOPIC-UA: NORMAL
MONOCYTES # BLD AUTO: 0.41 K/UL
MONOCYTES NFR BLD AUTO: 9.1 %
NEUTROPHILS # BLD AUTO: 2.35 K/UL
NEUTROPHILS NFR BLD AUTO: 52.1 %
NITRITE URINE: NEGATIVE
PH URINE: 7
PLATELET # BLD AUTO: 275 K/UL
POTASSIUM SERPL-SCNC: 4.8 MMOL/L
PROT SERPL-MCNC: 7.8 G/DL
PROTEIN URINE: NEGATIVE
RBC # BLD: 4.58 M/UL
RBC # FLD: 12.7 %
RED BLOOD CELLS URINE: 1 /HPF
SODIUM SERPL-SCNC: 144 MMOL/L
SPECIFIC GRAVITY URINE: 1.01
SQUAMOUS EPITHELIAL CELLS: 0 /HPF
T PALLIDUM AB SER QL IA: NEGATIVE
TRIGL SERPL-MCNC: 67 MG/DL
TSH SERPL-ACNC: 0.92 UIU/ML
UROBILINOGEN URINE: NORMAL
WBC # FLD AUTO: 4.51 K/UL
WHITE BLOOD CELLS URINE: 0 /HPF

## 2020-10-15 ENCOUNTER — APPOINTMENT (OUTPATIENT)
Dept: RADIOLOGY | Facility: CLINIC | Age: 55
End: 2020-10-15
Payer: COMMERCIAL

## 2020-10-15 ENCOUNTER — OUTPATIENT (OUTPATIENT)
Dept: OUTPATIENT SERVICES | Facility: HOSPITAL | Age: 55
LOS: 1 days | End: 2020-10-15

## 2020-10-15 ENCOUNTER — APPOINTMENT (OUTPATIENT)
Dept: ULTRASOUND IMAGING | Facility: CLINIC | Age: 55
End: 2020-10-15
Payer: COMMERCIAL

## 2020-10-15 DIAGNOSIS — S62.102A FRACTURE OF UNSPECIFIED CARPAL BONE, LEFT WRIST, INITIAL ENCOUNTER FOR CLOSED FRACTURE: Chronic | ICD-10-CM

## 2020-10-15 PROCEDURE — 77085 DXA BONE DENSITY AXL VRT FX: CPT | Mod: 26

## 2020-10-15 PROCEDURE — 76536 US EXAM OF HEAD AND NECK: CPT | Mod: 26

## 2020-10-20 ENCOUNTER — APPOINTMENT (OUTPATIENT)
Dept: OBGYN | Facility: CLINIC | Age: 55
End: 2020-10-20
Payer: COMMERCIAL

## 2020-10-20 VITALS — TEMPERATURE: 98.3 F | SYSTOLIC BLOOD PRESSURE: 120 MMHG | DIASTOLIC BLOOD PRESSURE: 80 MMHG | HEIGHT: 62 IN

## 2020-10-20 PROCEDURE — 99072 ADDL SUPL MATRL&STAF TM PHE: CPT

## 2020-10-20 PROCEDURE — 99396 PREV VISIT EST AGE 40-64: CPT

## 2020-10-20 NOTE — COUNSELING
[Nutrition/ Exercise/ Weight Management] : nutrition, exercise, weight management [STD (testing, results, tx)] : STD (testing, results, tx) [Bladder Hygiene] : bladder hygiene

## 2020-10-20 NOTE — HISTORY OF PRESENT ILLNESS
[FreeTextEntry1] : 54 yo presents for well women visit, she has no complaints.  Samuel had breast biopsy few months ago. Sexually active no issue. Still has urinary frequency and urgency, some stress incontinence  [Patient reported mammogram was abnormal] : Patient reported mammogram was abnormal [Patient reported bone density results were abnormal] : Patient reported bone density results were abnormal [Patient reported colonoscopy was normal] : Patient reported colonoscopy was normal [Mammogramdate] : 2020 [PapSmeardate] : 2017 [ColonoscopyDate] : 2019 [BoneDensityDate] : 2020 [No] : Patient does not have concerns regarding sex [Currently Active] : currently active [Men] : men

## 2020-10-20 NOTE — PHYSICAL EXAM
[No Lymphadenopathy] : no lymphadenopathy [Appropriately responsive] : appropriately responsive [Soft] : soft [Non-tender] : non-tender [Oriented x3] : oriented x3 [Non-distended] : non-distended [Examination Of The Breasts] : a normal appearance [___] : a [unfilled] ~Ucm mastectomy scar [No Masses] : no breast masses were palpable [Vulvar Atrophy] : vulvar atrophy [Atrophy] : atrophy [Normal] : normal [Anteversion] : anteverted [Uterine Adnexae] : normal [No Tenderness] : no tenderness [Nl Sphincter Tone] : normal sphincter tone [FreeTextEntry9] : no nodules, no masses

## 2020-10-24 RX ORDER — BUDESONIDE AND FORMOTEROL FUMARATE DIHYDRATE 80; 4.5 UG/1; UG/1
80-4.5 AEROSOL RESPIRATORY (INHALATION)
Qty: 10 | Refills: 0 | Status: COMPLETED | COMMUNITY
Start: 2020-09-30

## 2020-10-26 ENCOUNTER — APPOINTMENT (OUTPATIENT)
Dept: INTERNAL MEDICINE | Facility: CLINIC | Age: 55
End: 2020-10-26
Payer: COMMERCIAL

## 2020-10-26 PROCEDURE — 99213 OFFICE O/P EST LOW 20 MIN: CPT | Mod: 95

## 2020-10-26 RX ORDER — CALCIUM CITRATE/VITAMIN D3 315MG-6.25
TABLET ORAL
Refills: 0 | Status: ACTIVE | COMMUNITY

## 2020-10-26 NOTE — HISTORY OF PRESENT ILLNESS
[Home] : at home, [unfilled] , at the time of the visit. [Medical Office: ([facility])___] : at the medical office located in  [Verbal consent obtained from patient] : the patient, [unfilled] [FreeTextEntry8] : c/o would like to review labs and DEXA results

## 2020-10-26 NOTE — HISTORY OF PRESENT ILLNESS
[Home] : at home, [unfilled] , at the time of the visit. [Medical Office: ([facility])___] : at the medical office located in  [Verbal consent obtained from patient] : the patient, [unfilled] [FreeTextEntry8] : c/o would like to review labs and DEXA results

## 2020-10-26 NOTE — PHYSICAL EXAM
[No Acute Distress] : no acute distress [Normal Sclera/Conjunctiva] : normal sclera/conjunctiva [No Respiratory Distress] : no respiratory distress  [Normal] : affect was normal and insight and judgment were intact

## 2020-10-26 NOTE — REVIEW OF SYSTEMS
[Negative] : Heme/Lymph [Fever] : no fever [Vision Problems] : no vision problems [Hearing Loss] : no hearing loss [Dysuria] : no dysuria [Vaginal Discharge] : no vaginal discharge

## 2020-11-02 ENCOUNTER — APPOINTMENT (OUTPATIENT)
Dept: ULTRASOUND IMAGING | Facility: HOSPITAL | Age: 55
End: 2020-11-02

## 2020-11-02 ENCOUNTER — APPOINTMENT (OUTPATIENT)
Dept: MAMMOGRAPHY | Facility: HOSPITAL | Age: 55
End: 2020-11-02

## 2020-11-03 LAB — HPV HIGH+LOW RISK DNA PNL CVX: NOT DETECTED

## 2020-11-16 ENCOUNTER — RX CHANGE (OUTPATIENT)
Age: 55
End: 2020-11-16

## 2020-11-16 RX ORDER — ALENDRONATE SODIUM 70 MG/1
70 TABLET ORAL
Qty: 12 | Refills: 3 | Status: DISCONTINUED | COMMUNITY
Start: 2020-10-26 | End: 2020-11-16

## 2020-11-17 ENCOUNTER — APPOINTMENT (OUTPATIENT)
Dept: INTERNAL MEDICINE | Facility: CLINIC | Age: 55
End: 2020-11-17
Payer: COMMERCIAL

## 2020-11-17 VITALS
BODY MASS INDEX: 22.45 KG/M2 | WEIGHT: 122 LBS | SYSTOLIC BLOOD PRESSURE: 121 MMHG | OXYGEN SATURATION: 100 % | TEMPERATURE: 97.6 F | HEIGHT: 62 IN | HEART RATE: 59 BPM | DIASTOLIC BLOOD PRESSURE: 75 MMHG

## 2020-11-17 PROCEDURE — 99072 ADDL SUPL MATRL&STAF TM PHE: CPT

## 2020-11-17 PROCEDURE — 36415 COLL VENOUS BLD VENIPUNCTURE: CPT

## 2020-11-17 RX ORDER — MAGNESIUM SULFATE 100 MG
CAPSULE ORAL
Refills: 0 | Status: DISCONTINUED | COMMUNITY
End: 2020-11-17

## 2020-11-17 NOTE — HISTORY OF PRESENT ILLNESS
[FreeTextEntry1] : Here for blood tests  [de-identified] : c/o saw Optho 3 days ago left eye pain and redness- dx Iritis - tx Prednisolone every hrs -sx improved after 3 days but not as quickly as expected- Rx changed to Inveltys x one week\par referred to PCP for blood tests \par photophobia \par no eye trauma/foreign body sensation \par no blurred vision\par no HA/jaw pain\par no h/o DM\par no h/o CT disease\par work

## 2020-11-18 LAB
BASOPHILS # BLD AUTO: 0.04 K/UL
BASOPHILS NFR BLD AUTO: 0.8 %
CALCIUM SERPL-MCNC: 9.4 MG/DL
CRP SERPL-MCNC: <0.1 MG/DL
EOSINOPHIL # BLD AUTO: 0.12 K/UL
EOSINOPHIL NFR BLD AUTO: 2.3 %
ERYTHROCYTE [SEDIMENTATION RATE] IN BLOOD BY WESTERGREN METHOD: 27 MM/HR
HCT VFR BLD CALC: 44.3 %
HGB BLD-MCNC: 14.4 G/DL
IMM GRANULOCYTES NFR BLD AUTO: 0.2 %
LYMPHOCYTES # BLD AUTO: 1.73 K/UL
LYMPHOCYTES NFR BLD AUTO: 32.6 %
MAN DIFF?: NORMAL
MCHC RBC-ENTMCNC: 31 PG
MCHC RBC-ENTMCNC: 32.5 GM/DL
MCV RBC AUTO: 95.5 FL
MONOCYTES # BLD AUTO: 0.31 K/UL
MONOCYTES NFR BLD AUTO: 5.8 %
NEUTROPHILS # BLD AUTO: 3.09 K/UL
NEUTROPHILS NFR BLD AUTO: 58.3 %
PLATELET # BLD AUTO: 305 K/UL
RBC # BLD: 4.64 M/UL
RBC # FLD: 12.6 %
RHEUMATOID FACT SER QL: <10 IU/ML
T PALLIDUM AB SER QL IA: NEGATIVE
WBC # FLD AUTO: 5.3 K/UL

## 2020-11-20 LAB
ANA SER IF-ACNC: NEGATIVE
M TB IFN-G BLD-IMP: NEGATIVE
QUANTIFERON TB PLUS MITOGEN MINUS NIL: 5.78 IU/ML
QUANTIFERON TB PLUS NIL: 0.17 IU/ML
QUANTIFERON TB PLUS TB1 MINUS NIL: -0.02 IU/ML
QUANTIFERON TB PLUS TB2 MINUS NIL: -0.02 IU/ML

## 2020-11-23 LAB
ACRM BINDING ANTIBODY: 0 NMOL/L
HLA-B27 RELATED AG QL: NORMAL
LYSOZYME SERPL-MCNC: 4.6 MCG/ML

## 2020-11-29 ENCOUNTER — NON-APPOINTMENT (OUTPATIENT)
Age: 55
End: 2020-11-29

## 2020-12-08 ENCOUNTER — APPOINTMENT (OUTPATIENT)
Dept: MRI IMAGING | Facility: HOSPITAL | Age: 55
End: 2020-12-08
Payer: COMMERCIAL

## 2020-12-08 ENCOUNTER — RESULT REVIEW (OUTPATIENT)
Age: 55
End: 2020-12-08

## 2020-12-08 ENCOUNTER — OUTPATIENT (OUTPATIENT)
Dept: OUTPATIENT SERVICES | Facility: HOSPITAL | Age: 55
LOS: 1 days | End: 2020-12-08
Payer: COMMERCIAL

## 2020-12-08 DIAGNOSIS — S62.102A FRACTURE OF UNSPECIFIED CARPAL BONE, LEFT WRIST, INITIAL ENCOUNTER FOR CLOSED FRACTURE: Chronic | ICD-10-CM

## 2020-12-08 PROCEDURE — 77049 MRI BREAST C-+ W/CAD BI: CPT | Mod: 26

## 2020-12-08 PROCEDURE — C8908: CPT

## 2020-12-08 PROCEDURE — 77049 MRI BREAST C-+ W/CAD BI: CPT

## 2020-12-08 PROCEDURE — A9585: CPT

## 2020-12-08 PROCEDURE — C8937: CPT

## 2020-12-14 ENCOUNTER — RESULT REVIEW (OUTPATIENT)
Age: 55
End: 2020-12-14

## 2020-12-14 ENCOUNTER — APPOINTMENT (OUTPATIENT)
Dept: MAMMOGRAPHY | Facility: HOSPITAL | Age: 55
End: 2020-12-14
Payer: COMMERCIAL

## 2020-12-14 ENCOUNTER — OUTPATIENT (OUTPATIENT)
Dept: OUTPATIENT SERVICES | Facility: HOSPITAL | Age: 55
LOS: 1 days | End: 2020-12-14
Payer: COMMERCIAL

## 2020-12-14 ENCOUNTER — APPOINTMENT (OUTPATIENT)
Dept: ULTRASOUND IMAGING | Facility: HOSPITAL | Age: 55
End: 2020-12-14
Payer: COMMERCIAL

## 2020-12-14 ENCOUNTER — NON-APPOINTMENT (OUTPATIENT)
Age: 55
End: 2020-12-14

## 2020-12-14 DIAGNOSIS — S62.102A FRACTURE OF UNSPECIFIED CARPAL BONE, LEFT WRIST, INITIAL ENCOUNTER FOR CLOSED FRACTURE: Chronic | ICD-10-CM

## 2020-12-14 PROCEDURE — 76641 ULTRASOUND BREAST COMPLETE: CPT | Mod: 26,50

## 2020-12-14 PROCEDURE — 76641 ULTRASOUND BREAST COMPLETE: CPT

## 2020-12-14 PROCEDURE — 77066 DX MAMMO INCL CAD BI: CPT

## 2020-12-14 PROCEDURE — G0279: CPT

## 2020-12-14 PROCEDURE — G0279: CPT | Mod: 26

## 2020-12-14 PROCEDURE — 77066 DX MAMMO INCL CAD BI: CPT | Mod: 26

## 2020-12-22 ENCOUNTER — NON-APPOINTMENT (OUTPATIENT)
Age: 55
End: 2020-12-22

## 2020-12-22 ENCOUNTER — RESULT REVIEW (OUTPATIENT)
Age: 55
End: 2020-12-22

## 2020-12-22 ENCOUNTER — APPOINTMENT (OUTPATIENT)
Dept: MRI IMAGING | Facility: HOSPITAL | Age: 55
End: 2020-12-22
Payer: COMMERCIAL

## 2020-12-22 ENCOUNTER — OUTPATIENT (OUTPATIENT)
Dept: OUTPATIENT SERVICES | Facility: HOSPITAL | Age: 55
LOS: 1 days | End: 2020-12-22
Payer: COMMERCIAL

## 2020-12-22 DIAGNOSIS — S62.102A FRACTURE OF UNSPECIFIED CARPAL BONE, LEFT WRIST, INITIAL ENCOUNTER FOR CLOSED FRACTURE: Chronic | ICD-10-CM

## 2020-12-22 PROCEDURE — 88305 TISSUE EXAM BY PATHOLOGIST: CPT

## 2020-12-22 PROCEDURE — A4648: CPT

## 2020-12-22 PROCEDURE — 77066 DX MAMMO INCL CAD BI: CPT

## 2020-12-22 PROCEDURE — 19086 BX BREAST ADD LESION MR IMAG: CPT

## 2020-12-22 PROCEDURE — 19086 BX BREAST ADD LESION MR IMAG: CPT | Mod: LT

## 2020-12-22 PROCEDURE — 77066 DX MAMMO INCL CAD BI: CPT | Mod: 26

## 2020-12-22 PROCEDURE — 19085 BX BREAST 1ST LESION MR IMAG: CPT

## 2020-12-22 PROCEDURE — 88305 TISSUE EXAM BY PATHOLOGIST: CPT | Mod: 26

## 2020-12-22 PROCEDURE — A9585: CPT

## 2020-12-22 PROCEDURE — 19085 BX BREAST 1ST LESION MR IMAG: CPT | Mod: RT

## 2020-12-23 LAB — SURGICAL PATHOLOGY STUDY: SIGNIFICANT CHANGE UP

## 2020-12-30 ENCOUNTER — APPOINTMENT (OUTPATIENT)
Dept: BREAST CENTER | Facility: CLINIC | Age: 55
End: 2020-12-30
Payer: COMMERCIAL

## 2020-12-30 VITALS — BODY MASS INDEX: 22.45 KG/M2 | OXYGEN SATURATION: 98 % | HEART RATE: 67 BPM | WEIGHT: 122 LBS | HEIGHT: 62 IN

## 2020-12-30 PROCEDURE — 99072 ADDL SUPL MATRL&STAF TM PHE: CPT

## 2020-12-30 PROCEDURE — 99213 OFFICE O/P EST LOW 20 MIN: CPT

## 2020-12-30 NOTE — PHYSICAL EXAM
[Supple] : supple [No Supraclavicular Adenopathy] : no supraclavicular adenopathy [No Thyromegaly] : no thyromegaly [Examined in the supine and seated position] : examined in the supine and seated position [Asymmetrical] : asymmetrical [No dominant masses] : no dominant masses in right breast  [No dominant masses] : no dominant masses left breast [No Nipple Retraction] : no left nipple retraction [No Nipple Discharge] : no left nipple discharge [No Axillary Lymphadenopathy] : no left axillary lymphadenopathy [de-identified] : circumareolar incision, well healed

## 2020-12-30 NOTE — DATA REVIEWED
[FreeTextEntry1] : --7/8/19 b/l mammo/US: no mammographic or sonographic evidence of malignancy, BIRADS-2 benign findings. \par \par --8/15/19 ([facility]) MRI breasts: dense breast tissue. Right breast, 0.5 cm enhancing lesion at 1n2.7, likely benign but not seen on prior imaging so targeted US with biopsy is recommended, vs MRI biopsy. BI-RADS 4. Left breast benign MRI findings. Chest: 1.3 cm area in the right middle lobe, follow-up CT scan may be indicated. \par \par --9/20/19 ([facility]) biopsy: right breast 1n2.7, benign breast tissue. Concordant, f/u MRI is recommended in one year. 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 \par \par -- 7/6/2020 ([facility]) b/l mmg: heterogenously dense breasts (of note-- previous was denoted scattered areas of fibroglandular density). No suspicious findings. BI-RADS 2. \par \par NEW DATA REVIEWED:\par \par -- 12/8/20 ([facility]) MRI breasts: RIGHT BREAST: 1. New 1.2 x 0.6 x 0.7 cm enhancing lesion 12:00 position, 3.5 cm from the nipple with indeterminate enhancement kinetics. Biopsy is recommended. Right breast mammogram and ultrasound are recommended to determine if it is amenable to ultrasound or stereotactic biopsy. Otherwise, MRI biopsy is recommended. 2. 0.4 cm subthreshold focus of non-mass enhancement at the 12:00 position, 4.5 cm from the nipple new from prior study. This can also be evaluated on mammogram and ultrasound. If it is negative, additional biopsies can be based on pathology. 3. 0.5 cm enhancing lesion at the 1:00 position, 2.7 cm from the nipple with indeterminate enhancement kinetics, delayed plateau type enhancement, unchanged in size but previously had benign enhancement kinetics. The microclip from previous biopsy is not associated with the lesion. Right breast mammogram and ultrasound are recommended to determine if it is amenable to ultrasound or stereotactic biopsy. Otherwise, MRI biopsy is recommended. LEFT BREAST: 0.5 cm enhancing lesion at the 5:30 axis, 4.5 cm from the nipple with benign enhancement kinetics, but new from prior study. Biopsy is recommended. Left breast mammogram and ultrasound are recommended to determine if it is amenable to ultrasound or stereotactic biopsy. Otherwise, MRI biopsy is recommended. BI-RADS 4B\par \par -- 12/14/20 ([facility]) b/l mmg & US: No mammographic abnormalities are seen to correspond with the enhancing lesions seen on MRI. Bilateral MRI guided biopsies are recommended 2. 1.2 x 0.4 cm ill-defined mildly hypoechoic lesion 11-12:00 right breast, 3.5 cm from the nipple possibly corresponding with the enhancing lesion seen on MRI. However, it is better visualized on MRI and a 0.7 cm enhancing lesion 1:00 position also needs right breast MRI biopsy. Negative left breast ultrasound. No sonographic abnormalities seen to correspond with the 0.5 cm enhancing lesion at the 5:30 axis. Therefore, bilateral MRI biopsies recommended. BI-RADS 4B\par \par -- 12/22/20 ([facility]) MRI biopsies: left 5:30 4.5cmFN benign breast tissue with fibroadenomatoid and fibrocystic changes, rare calcifications associated with benign epithelium. Right 12:00 3.5cmFN predominantly fatty tissue with rare skin adnexal glands, right 1:00 2.7cmFN predominantly fatty benign breast tissue with fibroadenoma

## 2020-12-30 NOTE — REASON FOR VISIT
[Follow-Up: _____] : a [unfilled] follow-up visit [FreeTextEntry1] : history of left LCIS and ALH, lifetime risk of 49%

## 2020-12-30 NOTE — ASSESSMENT
[FreeTextEntry1] : Samuel is a 55 year old post-menopausal female with a h/o left breast LCIS and ALH, s/p left excisional biopsy on 1/3/19. MILENA of 49%. She is s/p bilateral MRI guided biopsies with benign pathological findings. 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 \par \par Bilateral screening ultrasound in 6 months, if normal, plan for mammogram in 1 year and bilateral breast MRI in 1.5 years. \par \par VUS in CHECK2 and PTCH1. \par \par \par All patient questions were answered; she verbalized understanding of the information and plan of care.\par

## 2020-12-30 NOTE — HISTORY OF PRESENT ILLNESS
[FreeTextEntry1] : Samuel is a 55 year old post-menopausal female with a h/o left breast LCIS and ALH, s/p left excisional biopsy on 1/3/19. MILENA of 49%. She is s/p bilateral MRI guided biopsies with benign pathological findings. 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 \par \par Samuel followed up with her OB/GYN, Dr. Palma, regarding her hot flashes of which she reports have subsided. \par \par She followed up with Dr. Villasenor to discuss risk reduction therapy and it she decided against AI therapy.

## 2021-03-24 ENCOUNTER — RX CHANGE (OUTPATIENT)
Age: 56
End: 2021-03-24

## 2021-03-24 RX ORDER — ALENDRONATE SODIUM 70 MG/1
70 TABLET ORAL
Qty: 12 | Refills: 3 | Status: DISCONTINUED | COMMUNITY
Start: 2020-11-16 | End: 2021-03-24

## 2021-03-26 NOTE — ASSESSMENT
[FreeTextEntry1] : Miles has LCIS and was recommend for annual MMG/MRI, biannual exam, and oncology consultation for risk reducing medications. \par \par RTC 6mo\par Patient verbalized understanding.\par \par MILES was informed that I am leaving [facility]  and will be practicing at an outside institution. The patient understands that there should not be a gap in care and was offered to establish care under my replacement at [facility] or can choose to continue under my care at [facility]. The patient verbalized understanding of the above.\par \par   Grief reaction Grief reaction Grief reaction Grief reaction Grief reaction Grief reaction

## 2021-04-08 ENCOUNTER — NON-APPOINTMENT (OUTPATIENT)
Age: 56
End: 2021-04-08

## 2021-04-09 ENCOUNTER — APPOINTMENT (OUTPATIENT)
Dept: INTERNAL MEDICINE | Facility: CLINIC | Age: 56
End: 2021-04-09
Payer: COMMERCIAL

## 2021-04-09 ENCOUNTER — NON-APPOINTMENT (OUTPATIENT)
Age: 56
End: 2021-04-09

## 2021-04-09 VITALS
WEIGHT: 123 LBS | BODY MASS INDEX: 22.63 KG/M2 | HEART RATE: 61 BPM | HEIGHT: 62 IN | OXYGEN SATURATION: 97 % | SYSTOLIC BLOOD PRESSURE: 129 MMHG | TEMPERATURE: 98.2 F | DIASTOLIC BLOOD PRESSURE: 84 MMHG

## 2021-04-09 DIAGNOSIS — Z82.49 FAMILY HISTORY OF ISCHEMIC HEART DISEASE AND OTHER DISEASES OF THE CIRCULATORY SYSTEM: ICD-10-CM

## 2021-04-09 DIAGNOSIS — Z82.3 FAMILY HISTORY OF STROKE: ICD-10-CM

## 2021-04-09 PROCEDURE — 99072 ADDL SUPL MATRL&STAF TM PHE: CPT

## 2021-04-09 PROCEDURE — 99213 OFFICE O/P EST LOW 20 MIN: CPT | Mod: 25

## 2021-04-09 PROCEDURE — 93000 ELECTROCARDIOGRAM COMPLETE: CPT

## 2021-04-09 RX ORDER — BUDESONIDE AND FORMOTEROL FUMARATE DIHYDRATE 160; 4.5 UG/1; UG/1
160-4.5 AEROSOL RESPIRATORY (INHALATION)
Refills: 0 | Status: DISCONTINUED | COMMUNITY
End: 2021-04-09

## 2021-04-09 RX ORDER — LOTEPREDNOL ETABONATE 10 MG/ML
1 SUSPENSION TOPICAL
Refills: 0 | Status: DISCONTINUED | COMMUNITY
End: 2021-04-09

## 2021-04-09 NOTE — HISTORY OF PRESENT ILLNESS
[FreeTextEntry1] : pain right neck/shoulder- heaviness right shoulder [de-identified] : c/o pain right neck/shoulder pain and heaviness right shoulder x 3 weeks\par continuous\par 5/10\par radiates to right upper arm\par a/w stiffness middle right finger - \par a/w w numbness dorsum right hand on/off \par worse w heavy lifting/pushing/pulling\par able to sleep on right side\par no weakness right arm or leg\par no CP/palpitations/dizziness/SOB\par no fever, cough, sob, CP, pl CP, anosmia, n./v/d, sore throat\par no HA, blurred vision, slurred speech, gait disturbance, motor changes\par not related to activity or meals \par no h/o HTN, DM, CAD\par no h/o PUD \par no recent immunization to right arm \par FH CVA- mother x 3 \par FH CAD \par 8/2020 COVID PCR negative\par OTC none\par work  \par

## 2021-04-09 NOTE — REVIEW OF SYSTEMS
[Negative] : Heme/Lymph [FreeTextEntry9] : right neck pain, shoulder pain and heaviness [de-identified] : numbness dorsum right hand

## 2021-04-09 NOTE — PHYSICAL EXAM
[No Acute Distress] : no acute distress [Normal Sclera/Conjunctiva] : normal sclera/conjunctiva [Clear to Auscultation] : lungs were clear to auscultation bilaterally [Normal Rate] : normal rate  [Regular Rhythm] : with a regular rhythm [No Edema] : there was no peripheral edema [Soft] : abdomen soft [Non Tender] : non-tender [No CVA Tenderness] : no CVA  tenderness [No Rash] : no rash [Normal] : affect was normal and insight and judgment were intact

## 2021-05-07 ENCOUNTER — NON-APPOINTMENT (OUTPATIENT)
Age: 56
End: 2021-05-07

## 2021-05-07 ENCOUNTER — APPOINTMENT (OUTPATIENT)
Dept: HEART AND VASCULAR | Facility: CLINIC | Age: 56
End: 2021-05-07
Payer: COMMERCIAL

## 2021-05-07 VITALS
OXYGEN SATURATION: 98 % | TEMPERATURE: 97.6 F | SYSTOLIC BLOOD PRESSURE: 134 MMHG | HEART RATE: 62 BPM | BODY MASS INDEX: 22.45 KG/M2 | WEIGHT: 122 LBS | DIASTOLIC BLOOD PRESSURE: 77 MMHG | HEIGHT: 62 IN

## 2021-05-07 PROCEDURE — 99204 OFFICE O/P NEW MOD 45 MIN: CPT

## 2021-05-07 PROCEDURE — 93000 ELECTROCARDIOGRAM COMPLETE: CPT

## 2021-05-07 PROCEDURE — 99072 ADDL SUPL MATRL&STAF TM PHE: CPT

## 2021-05-07 NOTE — DISCUSSION/SUMMARY
[FreeTextEntry1] : SOB/abnormal ekg MILES and I had a discussion of his symptoms. Her risk for CAD falls intro intermediate. We will therefore move forward with a stress ekg and echocardiogram at this time to evaluate for obstructive CAD, provided an insurance approval can be obtained. Risks and benefits discussed.\par

## 2021-05-07 NOTE — REASON FOR VISIT
[Other: ____] : [unfilled] [FreeTextEntry1] : 56 year old female comes in for abnormal ekg. The study is remarkable for sinus rhyth. Non-specific ST_T abnormalities are also noted. As to symptoms, she remarks on occasional dyspnea. This is often noted with activity. Symptoms always improve at rest. We are discussing a cardiac work up for the above complains as one has not been done recently. Associated arm heavyness is also noted. \par

## 2021-06-01 ENCOUNTER — APPOINTMENT (OUTPATIENT)
Dept: ULTRASOUND IMAGING | Facility: IMAGING CENTER | Age: 56
End: 2021-06-01
Payer: COMMERCIAL

## 2021-06-01 ENCOUNTER — OUTPATIENT (OUTPATIENT)
Dept: OUTPATIENT SERVICES | Facility: HOSPITAL | Age: 56
LOS: 1 days | End: 2021-06-01
Payer: COMMERCIAL

## 2021-06-01 ENCOUNTER — RESULT REVIEW (OUTPATIENT)
Age: 56
End: 2021-06-01

## 2021-06-01 DIAGNOSIS — Z91.89 OTHER SPECIFIED PERSONAL RISK FACTORS, NOT ELSEWHERE CLASSIFIED: ICD-10-CM

## 2021-06-01 DIAGNOSIS — S62.102A FRACTURE OF UNSPECIFIED CARPAL BONE, LEFT WRIST, INITIAL ENCOUNTER FOR CLOSED FRACTURE: Chronic | ICD-10-CM

## 2021-06-01 PROCEDURE — 76641 ULTRASOUND BREAST COMPLETE: CPT | Mod: 26,50

## 2021-06-01 PROCEDURE — 76641 ULTRASOUND BREAST COMPLETE: CPT

## 2021-06-02 ENCOUNTER — NON-APPOINTMENT (OUTPATIENT)
Age: 56
End: 2021-06-02

## 2021-06-11 ENCOUNTER — APPOINTMENT (OUTPATIENT)
Dept: HEART AND VASCULAR | Facility: CLINIC | Age: 56
End: 2021-06-11

## 2021-06-16 ENCOUNTER — APPOINTMENT (OUTPATIENT)
Dept: HEART AND VASCULAR | Facility: CLINIC | Age: 56
End: 2021-06-16

## 2021-06-30 ENCOUNTER — NON-APPOINTMENT (OUTPATIENT)
Age: 56
End: 2021-06-30

## 2021-07-02 ENCOUNTER — APPOINTMENT (OUTPATIENT)
Dept: BREAST CENTER | Facility: CLINIC | Age: 56
End: 2021-07-02
Payer: COMMERCIAL

## 2021-07-02 VITALS — HEART RATE: 65 BPM | WEIGHT: 122 LBS | OXYGEN SATURATION: 98 % | HEIGHT: 62 IN | BODY MASS INDEX: 22.45 KG/M2

## 2021-07-02 PROCEDURE — 99213 OFFICE O/P EST LOW 20 MIN: CPT

## 2021-07-02 NOTE — PHYSICAL EXAM
[Supple] : supple [No Supraclavicular Adenopathy] : no supraclavicular adenopathy [No Thyromegaly] : no thyromegaly [Examined in the supine and seated position] : examined in the supine and seated position [No dominant masses] : no dominant masses in right breast  [No dominant masses] : no dominant masses left breast [No Nipple Retraction] : no left nipple retraction [No Nipple Discharge] : no left nipple discharge [No Axillary Lymphadenopathy] : no left axillary lymphadenopathy [No Rashes] : no rashes [No Ulceration] : no ulceration [Symmetrical] : symmetrical [Breast Nipple Inversion] : nipples not inverted [Breast Nipple Retraction] : nipples not retracted [Breast Nipple Flattening] : nipples not flattened [Breast Nipple Fissures] : nipples not fissured [de-identified] : circumareolar incision, well healed

## 2021-07-02 NOTE — DATA REVIEWED
[FreeTextEntry1] : --7/8/19 b/l mammo/US: no mammographic or sonographic evidence of malignancy, BIRADS-2 benign findings. \par \par --8/15/19 ([facility]) MRI breasts: dense breast tissue. Right breast, 0.5 cm enhancing lesion at 1n2.7, likely benign but not seen on prior imaging so targeted US with biopsy is recommended, vs MRI biopsy. BI-RADS 4. Left breast benign MRI findings. Chest: 1.3 cm area in the right middle lobe, follow-up CT scan may be indicated. \par \par --9/20/19 ([facility]) biopsy: right breast 1n2.7, benign breast tissue. Concordant, f/u MRI is recommended in one year. 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 \par \par -- 7/6/2020 ([facility]) b/l mmg: heterogenously dense breasts (of note-- previous was denoted scattered areas of fibroglandular density). No suspicious findings. BI-RADS 2. \par \par NEW DATA REVIEWED:\par \par -- 12/8/20 ([facility]) MRI breasts: RIGHT BREAST: 1. New 1.2 x 0.6 x 0.7 cm enhancing lesion 12:00 position, 3.5 cm from the nipple with indeterminate enhancement kinetics. Biopsy is recommended. Right breast mammogram and ultrasound are recommended to determine if it is amenable to ultrasound or stereotactic biopsy. Otherwise, MRI biopsy is recommended. 2. 0.4 cm subthreshold focus of non-mass enhancement at the 12:00 position, 4.5 cm from the nipple new from prior study. This can also be evaluated on mammogram and ultrasound. If it is negative, additional biopsies can be based on pathology. 3. 0.5 cm enhancing lesion at the 1:00 position, 2.7 cm from the nipple with indeterminate enhancement kinetics, delayed plateau type enhancement, unchanged in size but previously had benign enhancement kinetics. The microclip from previous biopsy is not associated with the lesion. Right breast mammogram and ultrasound are recommended to determine if it is amenable to ultrasound or stereotactic biopsy. Otherwise, MRI biopsy is recommended. LEFT BREAST: 0.5 cm enhancing lesion at the 5:30 axis, 4.5 cm from the nipple with benign enhancement kinetics, but new from prior study. Biopsy is recommended. Left breast mammogram and ultrasound are recommended to determine if it is amenable to ultrasound or stereotactic biopsy. Otherwise, MRI biopsy is recommended. BI-RADS 4B\par \par -- 12/14/20 ([facility]) b/l mmg & US: No mammographic abnormalities are seen to correspond with the enhancing lesions seen on MRI. Bilateral MRI guided biopsies are recommended 2. 1.2 x 0.4 cm ill-defined mildly hypoechoic lesion 11-12:00 right breast, 3.5 cm from the nipple possibly corresponding with the enhancing lesion seen on MRI. However, it is better visualized on MRI and a 0.7 cm enhancing lesion 1:00 position also needs right breast MRI biopsy. Negative left breast ultrasound. No sonographic abnormalities seen to correspond with the 0.5 cm enhancing lesion at the 5:30 axis. Therefore, bilateral MRI biopsies recommended. BI-RADS 4B\par \par -- 12/22/20 ([facility]) MRI biopsies: left 5:30 4.5cmFN benign breast tissue with fibroadenomatoid and fibrocystic changes, rare calcifications associated with benign epithelium. Right 12:00 3.5cmFN predominantly fatty tissue with rare skin adnexal glands, right 1:00 2.7cmFN predominantly fatty benign breast tissue with fibroadenoma \par \par -- 12/22/20 ([facility]) MRI biopsies: left 5:30 4.5cmFN benign breast tissue with fibroadenomatoid and fibrocystic changes, rare calcifications associated with benign epithelium. Right 12:00 3.5cmFN predominantly fatty tissue with rare skin adnexal glands, right 1:00 2.7cmFN predominantly fatty benign breast tissue with fibroadenoma. \par \par -- 6/1/21 ([facility]) B/l breast US:No sonographic evidence of malignancy. Please note, on addendum to MR guided biopsy performed 12/22/2020, patient was recommended to resume annual mammogram in December 2021 and follow-up with MR also to be scheduled for December 2021. BI-RADS 1.

## 2021-07-02 NOTE — HISTORY OF PRESENT ILLNESS
[FreeTextEntry1] : Samuel is a 56 year old post-menopausal female presents for high risk screening with a h/o left breast LCIS and ALH, s/p left excisional biopsy on 1/3/19. MILENA of 49%. 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 \par \par Her OB/GYN, Dr. Palma.\par \par [facility] discuss risk reduction therapy and it she decided against AI.\par \par COVID Vaccination, 2nd dose, was May 14th

## 2021-07-02 NOTE — ASSESSMENT
[FreeTextEntry1] : Samuel is a 56 year old post-menopausal female with a h/o left breast LCIS and ALH, s/p left excisional biopsy on 1/3/19. MILENA of 49%. Genetic testing revealed results showing a VUS in CHECK2 and PTCH1. She will be due for a bilateral mammogram & US in December for screening; if benign RTC in 6 months and proceed with high risk MRI 6 months after the mmg/US. The patient verbalized understanding and is in agreement with the plan. \par \par All patient questions were answered; she verbalized understanding of the information and plan of care.\par

## 2021-07-21 ENCOUNTER — APPOINTMENT (OUTPATIENT)
Dept: HEART AND VASCULAR | Facility: CLINIC | Age: 56
End: 2021-07-21
Payer: COMMERCIAL

## 2021-07-21 VITALS
WEIGHT: 122 LBS | HEIGHT: 62 IN | HEART RATE: 73 BPM | SYSTOLIC BLOOD PRESSURE: 103 MMHG | DIASTOLIC BLOOD PRESSURE: 64 MMHG | OXYGEN SATURATION: 96 % | BODY MASS INDEX: 22.45 KG/M2

## 2021-07-21 PROCEDURE — 99214 OFFICE O/P EST MOD 30 MIN: CPT

## 2021-07-21 PROCEDURE — ZZZZZ: CPT

## 2021-07-21 PROCEDURE — 93351 STRESS TTE COMPLETE: CPT

## 2021-07-21 NOTE — DISCUSSION/SUMMARY
[FreeTextEntry1] : Abnormal ekg/ hand discomfort results discussed with the patient Inclined towards a conservative follow up in this patient. We had a careful discussion regarding diet and exercise. Will be happy to re-evaluate. Follow up as needed.

## 2021-07-21 NOTE — REASON FOR VISIT
[Symptom and Test Evaluation] : symptom and test evaluation [FreeTextEntry1] : 56 year old female comes in for abnormal ekg. The study is remarkable for sinus rhyth. Non-specific ST_T abnormalities are also noted. As to symptoms, she remarks on occasional dyspnea. This is often noted with activity. Symptoms always improve at rest. We are discussing a cardiac work up for the above complains as one has not been done recently. Associated arm heavyness is also noted. She completed a stress echo. We are discussing results \par

## 2021-08-11 NOTE — REASON FOR VISIT
OFFICE VISIT      Patient: Judd Flores Date of Service: 2021   : 1949 MRN: [id number]     SUBJECTIVE:     Chief Complaint   Patient presents with   • Office Visit   • Hospital F/U     for headache       HISTORY OF PRESENT ILLNESS:  Judd Flores is a 72 year old male who presents today for post-hospitalization follow up in [facility].    Admitted to [facility] from 8/3/21 - 21 for left-side headache. Although the patient has a history of brain hemangioma. CT head and CTA head and neck without any acute changes. 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 As per hospital, Left-sided headache possibly secondary to migraine headache. Treated with acetaminophen and switched Metoprolol for Nifedipine as requested by pt as he thought pain was maybe related to change on medication in the past. Pain has improved to ~80%, but persist, almost constant, radiating from neck up to his scalp in both side (L > R). Denies CP, SOB, dizziness, palpitations, fever, chills, depression, dysuria, N/V/D/C. Also, request further evaluation for his persistent cough.      PAST MEDICAL HISTORY:  Past Medical History:   Diagnosis Date   • Acute bronchitis 2018   • Anxiety    • Chronic back pain 2016   • Colon cancer screening 10/22/2019   • Constipation 2016   • Depressive disorder    • Diverticulitis    • Dyspepsia 2020   • Elevated PSA 2015   • Essential (primary) hypertension    • Hyperlipidemia    • LLQ abdominal pain 2020   • Needs flu shot 2020   • Transaminitis 3/16/2021   • URI, acute 2018   • Visual hallucinations 2019       MEDICATIONS:  Current Outpatient Medications   Medication Sig   • NIFEdipine CC (ADALAT CC) 30 MG 24 hr tablet Take 1 tablet by mouth daily.   • omeprazole (PriLOSEC) 40 MG capsule Take 40 mg by mouth daily.    • fluticasone (FLONASE) 50 MCG/ACT nasal spray Spray 1 spray in each nostril 2 times daily as needed  (congestion, cough).   • sertraline (ZOLOFT) 50 MG tablet TOME LEANA TABLETA TODOS LOS TAY (Patient taking differently: Take 50 mg by mouth at bedtime. )   • clonazePAM (KlonoPIN) 0.5 MG tablet TOME LEANA TABLETA AL ACOSTARSE CUANDO SEA NECESARIO PARA INSOMNIA (Patient taking differently: Take 0.5 mg by mouth nightly as needed. TOME LEANA TABLETA AL ACOSTARSE CUANDO SEA NECESARIO PARA INSOMNIA)   • atorvastatin (LIPITOR) 40 MG tablet Take 1 tablet by mouth nightly.   • hydrochlorothiazide (HYDRODIURIL) 25 MG tablet TOME LEANA TABLETA TODOS LOS TAY (Patient taking differently: Take 25 mg by mouth daily. TOME LEANA TABLETA TODOS LOS TAY)   • tamsulosin (FLOMAX) 0.4 MG Cap TOME LEANA CAPSULA TODOS LOS TAY AT NIGHT (Patient taking differently: Take 0.4 mg by mouth nightly. )   • finasteride (PROSCAR) 5 MG tablet TOME LEANA TABLETA TODOS LOS TAY (Patient taking differently: Take 5 mg by mouth daily. )   • thiamine (VITAMIN B1) 100 MG tablet Take 1 tablet by mouth daily.   • Cholecalciferol (vitamin D3) 25 mcg (1,000 units) capsule Take 1 capsule by mouth daily.   • baclofen (LIORESAL) 10 MG tablet Take 1 tablet by mouth 3 times daily as needed (muscle spasm).   • acetaminophen (TYLENOL) 325 MG tablet Take 650 mg by mouth every 6 hours as needed for Pain.     No current facility-administered medications for this visit.       ALLERGIES:  ALLERGIES:   Allergen Reactions   • Ciprofloxacin Hcl RASH       PAST SURGICAL HISTORY:  Past Surgical History:   Procedure Laterality Date   • No past surgeries         FAMILY HISTORY:  Family History   Problem Relation Age of Onset   • Hypertension Father    • Hyperlipidemia Father    • Patient is unaware of any medical problems Mother    • Cancer Neg Hx    • Stroke Neg Hx    • Heart disease Neg Hx        SOCIAL HISTORY:  Social History     Tobacco Use   • Smoking status: Never Smoker   • Smokeless tobacco: Never Used   Vaping Use   • Vaping Use: never used   Substance Use Topics   • Alcohol use:  Yes     Comment: weekends   • Drug use: Never       ROS:  Review of Systems   Constitutional: Negative.    HENT: Negative.    Eyes: Negative.    Respiratory: Positive for cough.    Cardiovascular: Negative.    Gastrointestinal: Negative.    Endocrine: Negative.    Genitourinary: Negative.    Musculoskeletal: Positive for neck pain.   Skin: Negative.    Allergic/Immunologic: Negative.    Neurological: Positive for headaches.   Hematological: Negative.    Psychiatric/Behavioral: Negative.        OBJECTIVE:     Visit Vitals  /58 (BP Location: LUE - Left upper extremity, Patient Position: Sitting, Cuff Size: Regular)   Pulse 76   Temp 97.8 °F (36.6 °C) (Temporal)   Resp 14   Ht 5' 7\" (1.702 m)   Wt 81.1 kg (178 lb 12.7 oz)   SpO2 94%   BMI 28.00 kg/m²       Physical Exam  Vitals reviewed.   Constitutional:       General: He is not in acute distress.     Appearance: He is well-developed.   HENT:      Head: Normocephalic and atraumatic.      Right Ear: External ear normal.      Left Ear: External ear normal.      Nose: Nose normal.   Eyes:      Conjunctiva/sclera: Conjunctivae normal.      Pupils: Pupils are equal, round, and reactive to light.   Cardiovascular:      Rate and Rhythm: Normal rate and regular rhythm.      Heart sounds: Normal heart sounds. No murmur heard.     Pulmonary:      Effort: Pulmonary effort is normal. No respiratory distress.      Breath sounds: Normal breath sounds. No stridor. No wheezing, rhonchi or rales.   Musculoskeletal:      Cervical back: Spasms and tenderness present. No swelling, edema, deformity, erythema, signs of trauma, lacerations, rigidity, torticollis, bony tenderness or crepitus. Pain with movement present. Decreased range of motion.   Neurological:      General: No focal deficit present.      Mental Status: He is oriented to person, place, and time. Mental status is at baseline.      Cranial Nerves: No cranial nerve deficit.      Sensory: No sensory deficit.      Motor:  No weakness.      Coordination: Coordination normal.      Gait: Gait normal.      Deep Tendon Reflexes: Reflexes normal.         Assessment AND PLAN:     Persistent cough  Not well controlled  Former smoker (2ppd x ~20 years. Quit 25 years ago). Currently smoking marijuana  CXR neg 2017  COVID-19 test neg  +pH test at Sleep medicine/ENT office  DDx: PND/Allergies, GERD, CHER, bronchiectasis, asthma, COPD, other. No ACEi  Cont Fexofenadine 180mg daily and Flonase nasal spray BID. Avoid triggers   Cont PPI, HOB elevation and avoid triggers   Cont CPAP machine for CHER. Sleep Medicine was following  F/u PFTs (rule out COPD/asthma)  Consider CT chest and Pulm referral if persist    Benign hypertension with CKD (chronic kidney disease), stage II  HTN with CKD 2 / HL  Well controlled  DASH diet  BP goal <140/90  Discontinue Metoprolol ER 50mg daily and resumed Nifedipine ER 30mg daily and continued HCTZ 25mg daily  CPM (Atorvastatin 40mg at bedtime)  Monitor    Cervicalgia  Cervicalgia / Cervical DJD / L headache  Worsening  I strongly believe this is the reason of his uncontrolled headaches (not Metoprolol or stable brain hemangioma)  CT cervical spine (2/17/21): significant DJD  Activity as tolerated. Apply heat. Discussed neck exercises. Follow instructions given. Take medication as directed.   Restart Baclofen 10mg QHS to TID as tolerated. Side effects discussed, avoid driving. Tylenol prn  Warm compress + massage with cream + neck exercises discussed.   MRI C-spine ordered  Pain clinic referral  Consider Neurosurgery referral if persist  Medical compliance with plan discussed and risks of non-compliance reviewed.   Patient education completed on disease process, etiology & prognosis.   Patient expresses understanding of the plan.   Proper usage and side effects of medications reviewed & discussed.   Return to clinic as clinically indicated as discussed with patient who verbalized understanding of & agreement with the  plan.       Patient Active Problem List   Diagnosis   • Gastroesophageal reflux disease without esophagitis   • Calcific tendinitis   • Chronic left shoulder pain   • Insomnia due to other mental disorder   • Elevated PSA   • Hyperlipidemia   • Benign hypertension with CKD (chronic kidney disease), stage II   • Sleep apnea, obstructive   • Prediabetes   • Osteoarthritis   • Benign prostatic hyperplasia without lower urinary tract symptoms   • Routine health maintenance   • History of colon polyps   • Allergic conjunctivitis of both eyes   • Allergic rhinitis   • Diverticulosis   • Hemangioma of intracranial structure (CMS/HCC)   • Cervicalgia   • Spondylosis of cervical region without myelopathy or radiculopathy   • Overweight   • Mild major depression (CMS/HCC)   • Persistent cough   • Left-sided headache         Yuri Varela MD   [Annual] : an annual visit.

## 2021-12-27 ENCOUNTER — APPOINTMENT (OUTPATIENT)
Dept: MAMMOGRAPHY | Facility: IMAGING CENTER | Age: 56
End: 2021-12-27
Payer: COMMERCIAL

## 2021-12-27 ENCOUNTER — APPOINTMENT (OUTPATIENT)
Dept: ULTRASOUND IMAGING | Facility: IMAGING CENTER | Age: 56
End: 2021-12-27
Payer: COMMERCIAL

## 2021-12-27 ENCOUNTER — OUTPATIENT (OUTPATIENT)
Dept: OUTPATIENT SERVICES | Facility: HOSPITAL | Age: 56
LOS: 1 days | End: 2021-12-27
Payer: COMMERCIAL

## 2021-12-27 DIAGNOSIS — Z00.8 ENCOUNTER FOR OTHER GENERAL EXAMINATION: ICD-10-CM

## 2021-12-27 DIAGNOSIS — S62.102A FRACTURE OF UNSPECIFIED CARPAL BONE, LEFT WRIST, INITIAL ENCOUNTER FOR CLOSED FRACTURE: Chronic | ICD-10-CM

## 2021-12-27 PROCEDURE — 77063 BREAST TOMOSYNTHESIS BI: CPT

## 2021-12-27 PROCEDURE — 76641 ULTRASOUND BREAST COMPLETE: CPT

## 2021-12-27 PROCEDURE — 76641 ULTRASOUND BREAST COMPLETE: CPT | Mod: 26,50

## 2021-12-27 PROCEDURE — 77063 BREAST TOMOSYNTHESIS BI: CPT | Mod: 26

## 2021-12-27 PROCEDURE — 77067 SCR MAMMO BI INCL CAD: CPT

## 2021-12-27 PROCEDURE — 77067 SCR MAMMO BI INCL CAD: CPT | Mod: 26

## 2022-01-05 ENCOUNTER — APPOINTMENT (OUTPATIENT)
Dept: ULTRASOUND IMAGING | Facility: IMAGING CENTER | Age: 57
End: 2022-01-05
Payer: COMMERCIAL

## 2022-01-05 ENCOUNTER — OUTPATIENT (OUTPATIENT)
Dept: OUTPATIENT SERVICES | Facility: HOSPITAL | Age: 57
LOS: 1 days | End: 2022-01-05
Payer: COMMERCIAL

## 2022-01-05 DIAGNOSIS — Z00.8 ENCOUNTER FOR OTHER GENERAL EXAMINATION: ICD-10-CM

## 2022-01-05 DIAGNOSIS — S62.102A FRACTURE OF UNSPECIFIED CARPAL BONE, LEFT WRIST, INITIAL ENCOUNTER FOR CLOSED FRACTURE: Chronic | ICD-10-CM

## 2022-01-05 PROCEDURE — 76642 ULTRASOUND BREAST LIMITED: CPT | Mod: 26,RT

## 2022-01-05 PROCEDURE — 76642 ULTRASOUND BREAST LIMITED: CPT

## 2022-01-06 ENCOUNTER — NON-APPOINTMENT (OUTPATIENT)
Age: 57
End: 2022-01-06

## 2022-01-07 ENCOUNTER — APPOINTMENT (OUTPATIENT)
Dept: BREAST CENTER | Facility: CLINIC | Age: 57
End: 2022-01-07

## 2022-01-14 ENCOUNTER — RESULT REVIEW (OUTPATIENT)
Age: 57
End: 2022-01-14

## 2022-01-14 ENCOUNTER — OUTPATIENT (OUTPATIENT)
Dept: OUTPATIENT SERVICES | Facility: HOSPITAL | Age: 57
LOS: 1 days | End: 2022-01-14
Payer: COMMERCIAL

## 2022-01-14 ENCOUNTER — APPOINTMENT (OUTPATIENT)
Dept: ULTRASOUND IMAGING | Facility: IMAGING CENTER | Age: 57
End: 2022-01-14
Payer: COMMERCIAL

## 2022-01-14 DIAGNOSIS — Z00.8 ENCOUNTER FOR OTHER GENERAL EXAMINATION: ICD-10-CM

## 2022-01-14 DIAGNOSIS — S62.102A FRACTURE OF UNSPECIFIED CARPAL BONE, LEFT WRIST, INITIAL ENCOUNTER FOR CLOSED FRACTURE: Chronic | ICD-10-CM

## 2022-01-14 PROCEDURE — 77065 DX MAMMO INCL CAD UNI: CPT

## 2022-01-14 PROCEDURE — A4648: CPT

## 2022-01-14 PROCEDURE — 19083 BX BREAST 1ST LESION US IMAG: CPT

## 2022-01-14 PROCEDURE — 19083 BX BREAST 1ST LESION US IMAG: CPT | Mod: RT

## 2022-01-14 PROCEDURE — 88305 TISSUE EXAM BY PATHOLOGIST: CPT | Mod: 26

## 2022-01-14 PROCEDURE — 88305 TISSUE EXAM BY PATHOLOGIST: CPT

## 2022-01-14 PROCEDURE — 77065 DX MAMMO INCL CAD UNI: CPT | Mod: 26,RT

## 2022-01-17 ENCOUNTER — RX RENEWAL (OUTPATIENT)
Age: 57
End: 2022-01-17

## 2022-01-22 DIAGNOSIS — R94.31 ABNORMAL ELECTROCARDIOGRAM [ECG] [EKG]: ICD-10-CM

## 2022-01-22 DIAGNOSIS — H57.89 OTHER SPECIFIED DISORDERS OF EYE AND ADNEXA: ICD-10-CM

## 2022-01-22 DIAGNOSIS — Z87.09 PERSONAL HISTORY OF OTHER DISEASES OF THE RESPIRATORY SYSTEM: ICD-10-CM

## 2022-01-22 DIAGNOSIS — N64.4 MASTODYNIA: ICD-10-CM

## 2022-01-22 DIAGNOSIS — R07.1 CHEST PAIN ON BREATHING: ICD-10-CM

## 2022-01-22 DIAGNOSIS — H57.12 OCULAR PAIN, LEFT EYE: ICD-10-CM

## 2022-01-22 DIAGNOSIS — Z11.59 ENCOUNTER FOR SCREENING FOR OTHER VIRAL DISEASES: ICD-10-CM

## 2022-01-25 ENCOUNTER — APPOINTMENT (OUTPATIENT)
Dept: INTERNAL MEDICINE | Facility: CLINIC | Age: 57
End: 2022-01-25
Payer: COMMERCIAL

## 2022-01-25 VITALS
SYSTOLIC BLOOD PRESSURE: 122 MMHG | HEART RATE: 65 BPM | TEMPERATURE: 98.1 F | DIASTOLIC BLOOD PRESSURE: 76 MMHG | HEIGHT: 62 IN | BODY MASS INDEX: 21.71 KG/M2 | OXYGEN SATURATION: 98 % | WEIGHT: 118 LBS

## 2022-01-25 DIAGNOSIS — R79.89 OTHER SPECIFIED ABNORMAL FINDINGS OF BLOOD CHEMISTRY: ICD-10-CM

## 2022-01-25 DIAGNOSIS — M54.2 CERVICALGIA: ICD-10-CM

## 2022-01-25 DIAGNOSIS — G89.29 PAIN IN RIGHT SHOULDER: ICD-10-CM

## 2022-01-25 DIAGNOSIS — H04.123 DRY EYE SYNDROME OF BILATERAL LACRIMAL GLANDS: ICD-10-CM

## 2022-01-25 DIAGNOSIS — D05.00 LOBULAR CARCINOMA IN SITU OF UNSPECIFIED BREAST: ICD-10-CM

## 2022-01-25 DIAGNOSIS — R20.0 ANESTHESIA OF SKIN: ICD-10-CM

## 2022-01-25 DIAGNOSIS — R29.898 OTHER SYMPTOMS AND SIGNS INVOLVING THE MUSCULOSKELETAL SYSTEM: ICD-10-CM

## 2022-01-25 DIAGNOSIS — M25.511 PAIN IN RIGHT SHOULDER: ICD-10-CM

## 2022-01-25 DIAGNOSIS — R06.02 SHORTNESS OF BREATH: ICD-10-CM

## 2022-01-25 DIAGNOSIS — N39.3 STRESS INCONTINENCE (FEMALE) (MALE): ICD-10-CM

## 2022-01-25 DIAGNOSIS — H20.9 UNSPECIFIED IRIDOCYCLITIS: ICD-10-CM

## 2022-01-25 DIAGNOSIS — E01.0 IODINE-DEFICIENCY RELATED DIFFUSE (ENDEMIC) GOITER: ICD-10-CM

## 2022-01-25 PROCEDURE — 99396 PREV VISIT EST AGE 40-64: CPT | Mod: 25

## 2022-01-25 RX ORDER — ASCORBIC ACID 500 MG
TABLET ORAL
Refills: 0 | Status: DISCONTINUED | COMMUNITY
End: 2022-01-25

## 2022-01-25 RX ORDER — ARTIFICIAL TEARS 1; 2; 3 MG/ML; MG/ML; MG/ML
SOLUTION/ DROPS OPHTHALMIC
Refills: 0 | Status: DISCONTINUED | COMMUNITY
End: 2022-01-25

## 2022-01-25 NOTE — PHYSICAL EXAM
[No Acute Distress] : no acute distress [Normal Sclera/Conjunctiva] : normal sclera/conjunctiva [Clear to Auscultation] : lungs were clear to auscultation bilaterally [Normal Rate] : normal rate  [Regular Rhythm] : with a regular rhythm [No Edema] : there was no peripheral edema [Declined Breast Exam] : declined breast exam  [Soft] : abdomen soft [Non Tender] : non-tender [No CVA Tenderness] : no CVA  tenderness [No Rash] : no rash [Normal] : affect was normal and insight and judgment were intact

## 2022-01-26 ENCOUNTER — NON-APPOINTMENT (OUTPATIENT)
Age: 57
End: 2022-01-26

## 2022-01-26 LAB
25(OH)D3 SERPL-MCNC: 66.1 NG/ML
ALBUMIN SERPL ELPH-MCNC: 4.7 G/DL
ALP BLD-CCNC: 90 U/L
ALT SERPL-CCNC: 23 U/L
ANION GAP SERPL CALC-SCNC: 11 MMOL/L
APPEARANCE: CLEAR
AST SERPL-CCNC: 35 U/L
BACTERIA: NEGATIVE
BASOPHILS # BLD AUTO: 0.03 K/UL
BASOPHILS NFR BLD AUTO: 0.7 %
BILIRUB SERPL-MCNC: 0.3 MG/DL
BILIRUBIN URINE: NEGATIVE
BLOOD URINE: NEGATIVE
BUN SERPL-MCNC: 12 MG/DL
CALCIUM SERPL-MCNC: 10.1 MG/DL
CHLORIDE SERPL-SCNC: 102 MMOL/L
CHOLEST SERPL-MCNC: 175 MG/DL
CO2 SERPL-SCNC: 29 MMOL/L
COLOR: COLORLESS
CREAT SERPL-MCNC: 0.84 MG/DL
EOSINOPHIL # BLD AUTO: 0.13 K/UL
EOSINOPHIL NFR BLD AUTO: 2.9 %
ESTIMATED AVERAGE GLUCOSE: 108 MG/DL
FOLATE SERPL-MCNC: 11.6 NG/ML
GLUCOSE QUALITATIVE U: NEGATIVE
GLUCOSE SERPL-MCNC: 73 MG/DL
HBA1C MFR BLD HPLC: 5.4 %
HCT VFR BLD CALC: 45.9 %
HDLC SERPL-MCNC: 61 MG/DL
HGB BLD-MCNC: 14.6 G/DL
HYALINE CASTS: 0 /LPF
IMM GRANULOCYTES NFR BLD AUTO: 0.2 %
KETONES URINE: NEGATIVE
LDLC SERPL CALC-MCNC: 100 MG/DL
LEUKOCYTE ESTERASE URINE: NEGATIVE
LYMPHOCYTES # BLD AUTO: 1.65 K/UL
LYMPHOCYTES NFR BLD AUTO: 36.9 %
MAN DIFF?: NORMAL
MCHC RBC-ENTMCNC: 29.8 PG
MCHC RBC-ENTMCNC: 31.8 GM/DL
MCV RBC AUTO: 93.7 FL
MICROSCOPIC-UA: NORMAL
MONOCYTES # BLD AUTO: 0.31 K/UL
MONOCYTES NFR BLD AUTO: 6.9 %
NEUTROPHILS # BLD AUTO: 2.34 K/UL
NEUTROPHILS NFR BLD AUTO: 52.4 %
NITRITE URINE: NEGATIVE
NONHDLC SERPL-MCNC: 114 MG/DL
PH URINE: 7
PLATELET # BLD AUTO: 262 K/UL
POTASSIUM SERPL-SCNC: 4.8 MMOL/L
PROT SERPL-MCNC: 7.4 G/DL
PROTEIN URINE: NEGATIVE
RBC # BLD: 4.9 M/UL
RBC # FLD: 12.7 %
RED BLOOD CELLS URINE: 1 /HPF
SODIUM SERPL-SCNC: 143 MMOL/L
SPECIFIC GRAVITY URINE: 1.01
SQUAMOUS EPITHELIAL CELLS: 0 /HPF
T PALLIDUM AB SER QL IA: NEGATIVE
TRIGL SERPL-MCNC: 69 MG/DL
TSH SERPL-ACNC: 1.15 UIU/ML
UROBILINOGEN URINE: NORMAL
VIT B12 SERPL-MCNC: 726 PG/ML
WBC # FLD AUTO: 4.47 K/UL
WHITE BLOOD CELLS URINE: 0 /HPF

## 2022-01-27 NOTE — HEALTH RISK ASSESSMENT
[Never] : Never [No] : In the past 12 months have you used drugs other than those required for medical reasons? No [No falls in past year] : Patient reported no falls in the past year [0] : 2) Feeling down, depressed, or hopeless: Not at all (0) [PHQ-2 Negative - No further assessment needed] : PHQ-2 Negative - No further assessment needed [No Retinopathy] : No retinopathy [Patient reported mammogram was abnormal] : Patient reported mammogram was abnormal [Patient reported PAP Smear was normal] : Patient reported PAP Smear was normal [Patient reported colonoscopy was normal] : Patient reported colonoscopy was normal [HIV test declined] : HIV test declined [Hepatitis C test declined] : Hepatitis C test declined [With Family] : lives with family [Employed] : employed [] :  [Sexually Active] : sexually active [Fully functional (bathing, dressing, toileting, transferring, walking, feeding)] : Fully functional (bathing, dressing, toileting, transferring, walking, feeding) [Fully functional (using the telephone, shopping, preparing meals, housekeeping, doing laundry, using] : Fully functional and needs no help or supervision to perform IADLs (using the telephone, shopping, preparing meals, housekeeping, doing laundry, using transportation, managing medications and managing finances) [Smoke Detector] : smoke detector [Carbon Monoxide Detector] : carbon monoxide detector [Seat Belt] :  uses seat belt [Sunscreen] : uses sunscreen [With Patient/Caregiver] : , with patient/caregiver [Designated Healthcare Proxy] : Designated healthcare proxy [Name: ___] : Health Care Proxy's Name: [unfilled]  [Relationship: ___] : Relationship: [unfilled] [I will adhere to the patient's wishes.] : I will adhere to the patient's wishes. [Time Spent: ___ minutes] : Time Spent: [unfilled] minutes [[id number]] : 0 [EyeExamDate] : 4/1/2021 [Reports changes in hearing] : Reports no changes in hearing [Reports changes in vision] : Reports no changes in vision [Reports changes in dental health] : Reports no changes in dental health [Guns at Home] : no guns at home [MammogramDate] : 12/2021 [MammogramComments] : bx benign  [PapSmearDate] : 1/2021 [BoneDensityDate] : 10/2020 [BoneDensityComments] : osteoporosis - on Fosamax  [ColonoscopyDate] : 1/2018 [de-identified] :  [AdvancecareDate] : 1/25/2022

## 2022-01-27 NOTE — HEALTH RISK ASSESSMENT
Pc with Cara via , states that she thinks she may be pregnant because she woke up and felt dizzy today with a headache, and also while in the shower had some vaginal bleeding.  We discussed that she just had a negative pregnancy test on Monday, so would not recommend repeating it already at this time.    Has an upcoming appointment with an ultrasound on 5/17, and was wondering if she should be seen sooner.    I let her know that unfortunately that is the soonest available appointment that we have.  Should rest today and push fluids, and should contact us back if the dizziness persists.  Could present to the ED for severe symptoms.  States understanding.    [Never] : Never [No] : In the past 12 months have you used drugs other than those required for medical reasons? No [No falls in past year] : Patient reported no falls in the past year [0] : 2) Feeling down, depressed, or hopeless: Not at all (0) [PHQ-2 Negative - No further assessment needed] : PHQ-2 Negative - No further assessment needed [No Retinopathy] : No retinopathy [Patient reported mammogram was abnormal] : Patient reported mammogram was abnormal [Patient reported PAP Smear was normal] : Patient reported PAP Smear was normal [Patient reported colonoscopy was normal] : Patient reported colonoscopy was normal [HIV test declined] : HIV test declined [Hepatitis C test declined] : Hepatitis C test declined [With Family] : lives with family [Employed] : employed [] :  [Sexually Active] : sexually active [Fully functional (bathing, dressing, toileting, transferring, walking, feeding)] : Fully functional (bathing, dressing, toileting, transferring, walking, feeding) [Fully functional (using the telephone, shopping, preparing meals, housekeeping, doing laundry, using] : Fully functional and needs no help or supervision to perform IADLs (using the telephone, shopping, preparing meals, housekeeping, doing laundry, using transportation, managing medications and managing finances) [Smoke Detector] : smoke detector [Carbon Monoxide Detector] : carbon monoxide detector [Seat Belt] :  uses seat belt [Sunscreen] : uses sunscreen [With Patient/Caregiver] : , with patient/caregiver [Designated Healthcare Proxy] : Designated healthcare proxy [Name: ___] : Health Care Proxy's Name: [unfilled]  [Relationship: ___] : Relationship: [unfilled] [I will adhere to the patient's wishes.] : I will adhere to the patient's wishes. [Time Spent: ___ minutes] : Time Spent: [unfilled] minutes [[id number]] : 0 [EyeExamDate] : 4/1/2021 [Reports changes in hearing] : Reports no changes in hearing [Reports changes in vision] : Reports no changes in vision [Reports changes in dental health] : Reports no changes in dental health [Guns at Home] : no guns at home [MammogramDate] : 12/2021 [MammogramComments] : bx benign  [PapSmearDate] : 1/2021 [BoneDensityDate] : 10/2020 [BoneDensityComments] : osteoporosis - on Fosamax  [ColonoscopyDate] : 1/2018 [de-identified] :  [AdvancecareDate] : 1/25/2022

## 2022-02-02 ENCOUNTER — NON-APPOINTMENT (OUTPATIENT)
Age: 57
End: 2022-02-02

## 2022-02-02 LAB — HEMOCCULT STL QL IA: NEGATIVE

## 2022-02-10 ENCOUNTER — NON-APPOINTMENT (OUTPATIENT)
Age: 57
End: 2022-02-10

## 2022-02-10 LAB
BASOPHILS # BLD AUTO: 0.03 K/UL
BASOPHILS NFR BLD AUTO: 0.6 %
EOSINOPHIL # BLD AUTO: 0.2 K/UL
EOSINOPHIL NFR BLD AUTO: 3.7 %
HCT VFR BLD CALC: 43.1 %
HGB BLD-MCNC: 14.5 G/DL
IMM GRANULOCYTES NFR BLD AUTO: 0.2 %
LYMPHOCYTES # BLD AUTO: 2.31 K/UL
LYMPHOCYTES NFR BLD AUTO: 43.3 %
MAN DIFF?: NORMAL
MCHC RBC-ENTMCNC: 30.9 PG
MCHC RBC-ENTMCNC: 33.6 GM/DL
MCV RBC AUTO: 91.9 FL
MONOCYTES # BLD AUTO: 0.38 K/UL
MONOCYTES NFR BLD AUTO: 7.1 %
NEUTROPHILS # BLD AUTO: 2.41 K/UL
NEUTROPHILS NFR BLD AUTO: 45.1 %
PLATELET # BLD AUTO: 273 K/UL
RBC # BLD: 4.69 M/UL
RBC # FLD: 12.7 %
WBC # FLD AUTO: 5.34 K/UL

## 2022-03-18 ENCOUNTER — APPOINTMENT (OUTPATIENT)
Dept: BREAST CENTER | Facility: CLINIC | Age: 57
End: 2022-03-18

## 2022-04-11 PROBLEM — Z11.59 SCREENING FOR VIRAL DISEASE: Status: RESOLVED | Noted: 2020-10-09 | Resolved: 2022-01-22

## 2022-06-13 ENCOUNTER — OUTPATIENT (OUTPATIENT)
Dept: OUTPATIENT SERVICES | Facility: HOSPITAL | Age: 57
LOS: 1 days | End: 2022-06-13

## 2022-06-13 ENCOUNTER — APPOINTMENT (OUTPATIENT)
Dept: ULTRASOUND IMAGING | Facility: CLINIC | Age: 57
End: 2022-06-13
Payer: COMMERCIAL

## 2022-06-13 DIAGNOSIS — S62.102A FRACTURE OF UNSPECIFIED CARPAL BONE, LEFT WRIST, INITIAL ENCOUNTER FOR CLOSED FRACTURE: Chronic | ICD-10-CM

## 2022-06-13 PROCEDURE — 76536 US EXAM OF HEAD AND NECK: CPT | Mod: 26

## 2022-06-16 ENCOUNTER — APPOINTMENT (OUTPATIENT)
Dept: INTERNAL MEDICINE | Facility: CLINIC | Age: 57
End: 2022-06-16
Payer: COMMERCIAL

## 2022-06-16 ENCOUNTER — NON-APPOINTMENT (OUTPATIENT)
Age: 57
End: 2022-06-16

## 2022-06-16 VITALS
OXYGEN SATURATION: 97 % | HEIGHT: 62 IN | DIASTOLIC BLOOD PRESSURE: 76 MMHG | BODY MASS INDEX: 22.08 KG/M2 | HEART RATE: 61 BPM | TEMPERATURE: 97.9 F | SYSTOLIC BLOOD PRESSURE: 117 MMHG | WEIGHT: 120 LBS

## 2022-06-16 DIAGNOSIS — M79.644 PAIN IN RIGHT FINGER(S): ICD-10-CM

## 2022-06-16 DIAGNOSIS — R71.8 OTHER ABNORMALITY OF RED BLOOD CELLS: ICD-10-CM

## 2022-06-16 DIAGNOSIS — M25.571 PAIN IN RIGHT ANKLE AND JOINTS OF RIGHT FOOT: ICD-10-CM

## 2022-06-16 DIAGNOSIS — R19.4 CHANGE IN BOWEL HABIT: ICD-10-CM

## 2022-06-16 DIAGNOSIS — Z23 ENCOUNTER FOR IMMUNIZATION: ICD-10-CM

## 2022-06-16 PROCEDURE — 99214 OFFICE O/P EST MOD 30 MIN: CPT | Mod: 25

## 2022-06-16 PROCEDURE — 93000 ELECTROCARDIOGRAM COMPLETE: CPT

## 2022-06-16 NOTE — HISTORY OF PRESENT ILLNESS
[FreeTextEntry8] : \par c/o increased BM and "uncomfortable feeling in my belly"  x 3 weeks \par BM has increased from one to 3 a day-  \par has decreased carbs and increased fruits in diet \par no diarrhea/mucous\par no BRBPR/rectal pain\par no bloating/cramps\par no fever, nvd\par no increase gassiness\par no recent travel\par no unexplained weight loss\par no new medications/ change in dose \par no h/o lactose intolerance, gluten sensitivity \par no h/o IBD/IBS\par no h/o gallstones/kidney stones/PUD\par no h/o thyroid disease, DM\par 2018 screening colonoscopy - normal \par OTC none \par work

## 2022-06-17 ENCOUNTER — NON-APPOINTMENT (OUTPATIENT)
Age: 57
End: 2022-06-17

## 2022-06-17 LAB
ALBUMIN SERPL ELPH-MCNC: 4.4 G/DL
ALP BLD-CCNC: 70 U/L
ALT SERPL-CCNC: 24 U/L
AMYLASE/CREAT SERPL: 122 U/L
ANION GAP SERPL CALC-SCNC: 11 MMOL/L
APPEARANCE: CLEAR
AST SERPL-CCNC: 30 U/L
BACTERIA: NEGATIVE
BASOPHILS # BLD AUTO: 0.03 K/UL
BASOPHILS NFR BLD AUTO: 0.7 %
BILIRUB SERPL-MCNC: 0.6 MG/DL
BILIRUBIN URINE: NEGATIVE
BLOOD URINE: NEGATIVE
BUN SERPL-MCNC: 11 MG/DL
CALCIUM SERPL-MCNC: 9.4 MG/DL
CHLORIDE SERPL-SCNC: 104 MMOL/L
CO2 SERPL-SCNC: 24 MMOL/L
COLOR: NORMAL
CREAT SERPL-MCNC: 0.84 MG/DL
CRP SERPL-MCNC: <3 MG/L
EGFR: 81 ML/MIN/1.73M2
EOSINOPHIL # BLD AUTO: 0.13 K/UL
EOSINOPHIL NFR BLD AUTO: 2.8 %
ESTIMATED AVERAGE GLUCOSE: 105 MG/DL
GLUCOSE QUALITATIVE U: NEGATIVE
GLUCOSE SERPL-MCNC: 92 MG/DL
HBA1C MFR BLD HPLC: 5.3 %
HCT VFR BLD CALC: 43.2 %
HGB BLD-MCNC: 14.3 G/DL
HYALINE CASTS: 0 /LPF
IMM GRANULOCYTES NFR BLD AUTO: 0.2 %
KETONES URINE: NEGATIVE
LEUKOCYTE ESTERASE URINE: NEGATIVE
LPL SERPL-CCNC: 57 U/L
LYMPHOCYTES # BLD AUTO: 1.83 K/UL
LYMPHOCYTES NFR BLD AUTO: 40 %
MAN DIFF?: NORMAL
MCHC RBC-ENTMCNC: 30.5 PG
MCHC RBC-ENTMCNC: 33.1 GM/DL
MCV RBC AUTO: 92.1 FL
MICROSCOPIC-UA: NORMAL
MONOCYTES # BLD AUTO: 0.35 K/UL
MONOCYTES NFR BLD AUTO: 7.6 %
NEUTROPHILS # BLD AUTO: 2.23 K/UL
NEUTROPHILS NFR BLD AUTO: 48.7 %
NITRITE URINE: NEGATIVE
PH URINE: 7.5
PLATELET # BLD AUTO: 271 K/UL
POTASSIUM SERPL-SCNC: 4.5 MMOL/L
PROT SERPL-MCNC: 7.2 G/DL
PROTEIN URINE: NEGATIVE
RBC # BLD: 4.69 M/UL
RBC # FLD: 12.8 %
RED BLOOD CELLS URINE: 1 /HPF
SODIUM SERPL-SCNC: 140 MMOL/L
SPECIFIC GRAVITY URINE: 1.01
SQUAMOUS EPITHELIAL CELLS: 0 /HPF
TSH SERPL-ACNC: 0.82 UIU/ML
UROBILINOGEN URINE: NORMAL
WBC # FLD AUTO: 4.58 K/UL
WHITE BLOOD CELLS URINE: 0 /HPF

## 2022-07-13 ENCOUNTER — RESULT REVIEW (OUTPATIENT)
Age: 57
End: 2022-07-13

## 2022-07-13 ENCOUNTER — NON-APPOINTMENT (OUTPATIENT)
Age: 57
End: 2022-07-13

## 2022-07-13 ENCOUNTER — APPOINTMENT (OUTPATIENT)
Dept: ULTRASOUND IMAGING | Facility: CLINIC | Age: 57
End: 2022-07-13

## 2022-07-13 ENCOUNTER — OUTPATIENT (OUTPATIENT)
Dept: OUTPATIENT SERVICES | Facility: HOSPITAL | Age: 57
LOS: 1 days | End: 2022-07-13

## 2022-07-13 DIAGNOSIS — S62.102A FRACTURE OF UNSPECIFIED CARPAL BONE, LEFT WRIST, INITIAL ENCOUNTER FOR CLOSED FRACTURE: Chronic | ICD-10-CM

## 2022-07-13 PROCEDURE — 76700 US EXAM ABDOM COMPLETE: CPT | Mod: 26

## 2022-10-09 ENCOUNTER — NON-APPOINTMENT (OUTPATIENT)
Age: 57
End: 2022-10-09

## 2022-10-17 ENCOUNTER — OUTPATIENT (OUTPATIENT)
Dept: OUTPATIENT SERVICES | Facility: HOSPITAL | Age: 57
LOS: 1 days | End: 2022-10-17
Payer: COMMERCIAL

## 2022-10-17 ENCOUNTER — APPOINTMENT (OUTPATIENT)
Dept: RADIOLOGY | Facility: IMAGING CENTER | Age: 57
End: 2022-10-17

## 2022-10-17 ENCOUNTER — TRANSCRIPTION ENCOUNTER (OUTPATIENT)
Age: 57
End: 2022-10-17

## 2022-10-17 DIAGNOSIS — Z00.8 ENCOUNTER FOR OTHER GENERAL EXAMINATION: ICD-10-CM

## 2022-10-17 DIAGNOSIS — S62.102A FRACTURE OF UNSPECIFIED CARPAL BONE, LEFT WRIST, INITIAL ENCOUNTER FOR CLOSED FRACTURE: Chronic | ICD-10-CM

## 2022-10-17 PROCEDURE — 77085 DXA BONE DENSITY AXL VRT FX: CPT | Mod: 26

## 2022-10-17 PROCEDURE — 77085 DXA BONE DENSITY AXL VRT FX: CPT

## 2022-10-22 DIAGNOSIS — M81.6 LOCALIZED OSTEOPOROSIS [LEQUESNE]: ICD-10-CM

## 2022-10-24 ENCOUNTER — TRANSCRIPTION ENCOUNTER (OUTPATIENT)
Age: 57
End: 2022-10-24

## 2023-01-11 ENCOUNTER — APPOINTMENT (OUTPATIENT)
Dept: BREAST CENTER | Facility: CLINIC | Age: 58
End: 2023-01-11
Payer: COMMERCIAL

## 2023-01-11 VITALS
TEMPERATURE: 98 F | HEART RATE: 64 BPM | DIASTOLIC BLOOD PRESSURE: 80 MMHG | OXYGEN SATURATION: 96 % | HEIGHT: 62 IN | RESPIRATION RATE: 16 BRPM | BODY MASS INDEX: 22.63 KG/M2 | SYSTOLIC BLOOD PRESSURE: 113 MMHG | WEIGHT: 123 LBS

## 2023-01-11 DIAGNOSIS — R92.8 OTHER ABNORMAL AND INCONCLUSIVE FINDINGS ON DIAGNOSTIC IMAGING OF BREAST: ICD-10-CM

## 2023-01-11 PROCEDURE — 99213 OFFICE O/P EST LOW 20 MIN: CPT

## 2023-01-11 NOTE — DATA REVIEWED
[FreeTextEntry1] : --7/8/19 b/l mammo/US: no mammographic or sonographic evidence of malignancy, BIRADS-2 benign findings. \par \par --8/15/19 ([facility]) MRI breasts: dense breast tissue. Right breast, 0.5 cm enhancing lesion at 1n2.7, likely benign but not seen on prior imaging so targeted US with biopsy is recommended, vs MRI biopsy. BI-RADS 4. Left breast benign MRI findings. Chest: 1.3 cm area in the right middle lobe, follow-up CT scan may be indicated. \par \par --9/20/19 ([facility]) biopsy: right breast 1n2.7, benign breast tissue. Concordant, f/u MRI is recommended in one year. 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 \par \par -- 7/6/2020 ([facility]) b/l mmg: heterogenously dense breasts (of note-- previous was denoted scattered areas of fibroglandular density). No suspicious findings. BI-RADS 2. \par \par -- 12/8/20 ([facility]) MRI breasts: RIGHT BREAST: 1. New 1.2 x 0.6 x 0.7 cm enhancing lesion 12:00 position, 3.5 cm from the nipple with indeterminate enhancement kinetics. Biopsy is recommended. Right breast mammogram and ultrasound are recommended to determine if it is amenable to ultrasound or stereotactic biopsy. Otherwise, MRI biopsy is recommended. 2. 0.4 cm subthreshold focus of non-mass enhancement at the 12:00 position, 4.5 cm from the nipple new from prior study. This can also be evaluated on mammogram and ultrasound. If it is negative, additional biopsies can be based on pathology. 3. 0.5 cm enhancing lesion at the 1:00 position, 2.7 cm from the nipple with indeterminate enhancement kinetics, delayed plateau type enhancement, unchanged in size but previously had benign enhancement kinetics. The microclip from previous biopsy is not associated with the lesion. Right breast mammogram and ultrasound are recommended to determine if it is amenable to ultrasound or stereotactic biopsy. Otherwise, MRI biopsy is recommended. LEFT BREAST: 0.5 cm enhancing lesion at the 5:30 axis, 4.5 cm from the nipple with benign enhancement kinetics, but new from prior study. Biopsy is recommended. Left breast mammogram and ultrasound are recommended to determine if it is amenable to ultrasound or stereotactic biopsy. Otherwise, MRI biopsy is recommended. BI-RADS 4B\par \par -- 12/14/20 ([facility]) b/l mmg & US: No mammographic abnormalities are seen to correspond with the enhancing lesions seen on MRI. Bilateral MRI guided biopsies are recommended 2. 1.2 x 0.4 cm ill-defined mildly hypoechoic lesion 11-12:00 right breast, 3.5 cm from the nipple possibly corresponding with the enhancing lesion seen on MRI. However, it is better visualized on MRI and a 0.7 cm enhancing lesion 1:00 position also needs right breast MRI biopsy. Negative left breast ultrasound. No sonographic abnormalities seen to correspond with the 0.5 cm enhancing lesion at the 5:30 axis. Therefore, bilateral MRI biopsies recommended. BI-RADS 4B\par \par -- 12/22/20 ([facility]) MRI biopsies: left 5:30 4.5cmFN benign breast tissue with fibroadenomatoid and fibrocystic changes, rare calcifications associated with benign epithelium. Right 12:00 3.5cmFN predominantly fatty tissue with rare skin adnexal glands, right 1:00 2.7cmFN predominantly fatty benign breast tissue with fibroadenoma \par \par -- 12/22/20 ([facility]) MRI biopsies: left 5:30 4.5cmFN benign breast tissue with fibroadenomatoid and fibrocystic changes, rare calcifications associated with benign epithelium. Right 12:00 3.5cmFN predominantly fatty tissue with rare skin adnexal glands, right 1:00 2.7cmFN predominantly fatty benign breast tissue with fibroadenoma. \par \par -- 6/1/21 ([facility]) B/l breast US:No sonographic evidence of malignancy. Please note, on addendum to MR guided biopsy performed 12/22/2020, patient was recommended to resume annual mammogram in December 2021 and follow-up with MR also to be scheduled for December 2021. BI-RADS 1. \par \par 12/27/2021 ([facility]) Mmg & US: scattered fbg density. 0.4cm mass in the right breast at 11:00 for which targeted US is recommended. BI-RADS 0.\par \par 1/5/22 ([facility]) right breast US: indeterminate finding in the right breast at 11:00. US bx is recommended. BI-RADS 4A.\par \par 1/14/22 ([facility]) right breast 11n3, US bx: non-proliferative change; sclerosing adenosis. Benign & concordant. Rec US in 1 year.

## 2023-01-11 NOTE — HISTORY OF PRESENT ILLNESS
[FreeTextEntry1] : Samuel is a 57 year old post-menopausal female presents for high risk screening with a h/o left breast LCIS and ALH, s/p left excisional biopsy on 1/3/19. MILENA of 49%. 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 \par \par Her OB/GYN, Dr. Palma.\par \par [facility] to discuss risk reduction therapy and it she decided against AI.

## 2023-01-11 NOTE — PHYSICAL EXAM
[No Supraclavicular Adenopathy] : no supraclavicular adenopathy [Examined in the supine and seated position] : examined in the supine and seated position [Symmetrical] : symmetrical [No dominant masses] : no dominant masses in right breast  [No dominant masses] : no dominant masses left breast [No Nipple Retraction] : no left nipple retraction [No Nipple Discharge] : no left nipple discharge [No Axillary Lymphadenopathy] : no left axillary lymphadenopathy [No Rashes] : no rashes [No Ulceration] : no ulceration [Normocephalic] : normocephalic [Supple] : supple [Breast Nipple Inversion] : nipples not inverted [Breast Nipple Retraction] : nipples not retracted [Breast Nipple Flattening] : nipples not flattened [Breast Nipple Fissures] : nipples not fissured [No Edema] : no edema [de-identified] : circumareolar incision, well healed

## 2023-01-16 ENCOUNTER — OUTPATIENT (OUTPATIENT)
Dept: OUTPATIENT SERVICES | Facility: HOSPITAL | Age: 58
LOS: 1 days | End: 2023-01-16

## 2023-01-16 ENCOUNTER — RESULT REVIEW (OUTPATIENT)
Age: 58
End: 2023-01-16

## 2023-01-16 ENCOUNTER — APPOINTMENT (OUTPATIENT)
Dept: ULTRASOUND IMAGING | Facility: CLINIC | Age: 58
End: 2023-01-16
Payer: COMMERCIAL

## 2023-01-16 ENCOUNTER — APPOINTMENT (OUTPATIENT)
Dept: MAMMOGRAPHY | Facility: CLINIC | Age: 58
End: 2023-01-16
Payer: COMMERCIAL

## 2023-01-16 DIAGNOSIS — S62.102A FRACTURE OF UNSPECIFIED CARPAL BONE, LEFT WRIST, INITIAL ENCOUNTER FOR CLOSED FRACTURE: Chronic | ICD-10-CM

## 2023-01-16 PROCEDURE — 77067 SCR MAMMO BI INCL CAD: CPT | Mod: 26

## 2023-01-16 PROCEDURE — 77063 BREAST TOMOSYNTHESIS BI: CPT | Mod: 26

## 2023-01-16 PROCEDURE — 76641 ULTRASOUND BREAST COMPLETE: CPT | Mod: 50

## 2023-01-18 ENCOUNTER — TRANSCRIPTION ENCOUNTER (OUTPATIENT)
Age: 58
End: 2023-01-18

## 2023-02-07 ENCOUNTER — APPOINTMENT (OUTPATIENT)
Dept: ULTRASOUND IMAGING | Facility: IMAGING CENTER | Age: 58
End: 2023-02-07

## 2023-02-07 ENCOUNTER — APPOINTMENT (OUTPATIENT)
Dept: MAMMOGRAPHY | Facility: IMAGING CENTER | Age: 58
End: 2023-02-07

## 2023-02-27 ENCOUNTER — RX RENEWAL (OUTPATIENT)
Age: 58
End: 2023-02-27

## 2023-02-27 RX ORDER — ALENDRONATE SODIUM 70 MG/1
70 TABLET ORAL
Qty: 12 | Refills: 3 | Status: ACTIVE | COMMUNITY
Start: 2021-03-24 | End: 1900-01-01

## 2023-05-10 ENCOUNTER — APPOINTMENT (OUTPATIENT)
Dept: RADIOLOGY | Facility: CLINIC | Age: 58
End: 2023-05-10
Payer: COMMERCIAL

## 2023-05-10 ENCOUNTER — APPOINTMENT (OUTPATIENT)
Dept: RHEUMATOLOGY | Facility: CLINIC | Age: 58
End: 2023-05-10
Payer: COMMERCIAL

## 2023-05-10 ENCOUNTER — RESULT REVIEW (OUTPATIENT)
Age: 58
End: 2023-05-10

## 2023-05-10 ENCOUNTER — OUTPATIENT (OUTPATIENT)
Dept: OUTPATIENT SERVICES | Facility: HOSPITAL | Age: 58
LOS: 1 days | End: 2023-05-10

## 2023-05-10 VITALS
DIASTOLIC BLOOD PRESSURE: 84 MMHG | HEIGHT: 62 IN | HEART RATE: 66 BPM | WEIGHT: 123 LBS | OXYGEN SATURATION: 96 % | BODY MASS INDEX: 22.63 KG/M2 | TEMPERATURE: 98.1 F | SYSTOLIC BLOOD PRESSURE: 126 MMHG

## 2023-05-10 DIAGNOSIS — Z15.89 GENETIC SUSCEPTIBILITY TO OTHER DISEASE: ICD-10-CM

## 2023-05-10 DIAGNOSIS — G89.29 DORSALGIA, UNSPECIFIED: ICD-10-CM

## 2023-05-10 DIAGNOSIS — S62.102A FRACTURE OF UNSPECIFIED CARPAL BONE, LEFT WRIST, INITIAL ENCOUNTER FOR CLOSED FRACTURE: Chronic | ICD-10-CM

## 2023-05-10 DIAGNOSIS — M54.9 DORSALGIA, UNSPECIFIED: ICD-10-CM

## 2023-05-10 PROCEDURE — 72100 X-RAY EXAM L-S SPINE 2/3 VWS: CPT | Mod: 26

## 2023-05-10 PROCEDURE — 99204 OFFICE O/P NEW MOD 45 MIN: CPT | Mod: 25

## 2023-05-10 PROCEDURE — 72170 X-RAY EXAM OF PELVIS: CPT | Mod: 26

## 2023-05-10 PROCEDURE — 36415 COLL VENOUS BLD VENIPUNCTURE: CPT

## 2023-05-10 RX ORDER — ARTIFICIAL TEARS 1; 2; 3 MG/ML; MG/ML; MG/ML
SOLUTION/ DROPS OPHTHALMIC
Refills: 0 | Status: DISCONTINUED | COMMUNITY
End: 2023-05-10

## 2023-05-10 NOTE — ASSESSMENT
[FreeTextEntry1] : 58-year-old woman with history of osteoporosis, T score initially -3.0 in the lumbar spine and -2.8 in the femoral neck in October 2020.  Patient with risk factors for osteoporosis including postmenopausal status as well as mother with history of hip fracture.  Patient was started on alendronate in October 2020, tolerating well, with repeat bone density with improvement in the lumbar spine now -2.8 as well as the femoral neck now -2.4.  Reviewed method of administration with patient for alendronate use.  No side effects noted.  Will continue vitamin D supplementation and will try to increase calcium enriched foods in diet.  Discussed increasing weightbearing exercises on a daily basis, fall precautions discussed.  Will update labs today in office as well.  Would recommend continuing alendronate at this time, repeat bone density in 1 year.  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  Further management pending results\par \par

## 2023-05-10 NOTE — PHYSICAL EXAM
[General Appearance - Alert] : alert [General Appearance - In No Acute Distress] : in no acute distress [Neck Appearance] : the appearance of the neck was normal [] : no respiratory distress [Chest Palpation] : palpation of the chest revealed no abnormalities [Apical Impulse] : the apical impulse was normal [Heart Rate And Rhythm] : heart rate was normal and rhythm regular [FreeTextEntry1] : R. swollen PIP joint, intact  strength, 5/5 strength UE and LE

## 2023-05-10 NOTE — HISTORY OF PRESENT ILLNESS
[FreeTextEntry1] : 58 woman with PMH osteoporosis, LCIS \par \par New patient visit for osteoporosis \par Patient been taking alendronate weekly since 10/2020\par No side effects noted\par Takes Vitamin D-Calcium supplements daily \par No recent falls, last fall 2 years ago suffered from ankle strain, no recent fractures  \par Experiences some occasional hand tightness in R. hand (surgery in 2022, symptoms much improved since then) \par No etoh or tobacco use \par No weight loss or systemic symptoms \par Fam hx of RA in brother \par Walks to and from work, works as home , otherwise no exercise, 10 blocks one way, but not too much\par \par Had fall onto left wrist in 2010 with fracture\par Menopause at 47 years old\par Took Depo for one year for birth control but stopped due to weight gain\par No previous HRT\par \par Lactose intolerant as adult but not previously\par No dietary restrictions growing up\par Mother with hip fracture s/p fall, hip replacement\par \par No loss of height \par No PPI use\par No tobacco \par No etoh\par Of note, review of chart notes history of iritis in 11/2020, 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

## 2023-05-10 NOTE — DATA REVIEWED
[FreeTextEntry1] : \par  DEXA Bone Density Axial with Vertebral Fracture Assessment             Final\par \par No Documents Attached\par \par \par \par \par   EXAM: [id number] - XR BONE DENS AXIAL W VERT FX  - ORDERED BY: EMILY JAMES\par \par \par PROCEDURE DATE:  10/17/2022\par \par \par \par INTERPRETATION:  CLINICAL INDICATION: Osteoporosis. Taking alendronate for 2 years.\par \par Thank you for referring your patient for bone densitometry and vertebral fracture assessment on the  HOLOGIC machine.  The results are expressed as a T-score, or the standard deviation from the mean young normal value, which is the basis for the WHO diagnostic criteria for bone density.\par \par COMPARISON: Bone Density dated 10/15/2020 was reviewed. Comparison is limited due to different equipment/technique. Previous T-scores are listed for reference.\par \par RESULTS:\par \par Spine: -2.8, osteoporosis; previously -3.0.\par Femoral neck: -2.4 , osteopenia; previously -2.8.\par Total hip: -1.5 , osteopenia; previously -2.0.\par \par Single lateral view of the thoracolumbar spine (T10-L4)  demonstrates no evidence of vertebral compression deformity and no significant change.\par \par IMPRESSION: Osteoporosis.\par \par FRACTURE RISK: The risk of fracture is increased.\par \par TREATMENT RECOMMENDATIONS:  Medical therapy should be considered according to current guidelines.\par \par FOLLOW-UP: A repeat examination is recommended in 2 years.\par \par --- End of Report ---\par \par \par \par \par \par \par KATHERINE SPRING MD; Attending Nuclear Medicine\par This document has been electronically signed. Oct 22 2022  7:32AM\par \par  \par \par  Ordered by: EMILY JAMES       Collected/Examined: 17Oct2022 08:19AM       \par Verified by: EMILY JAMES 22Oct2022 09:00AM       \par  Result Communication: No patient communication needed at this time;\par Stage: Final       \par  Performed at: [facility] at the [facility]       Resulted: 22Oct2022 07:28AM       Last Updated: 22Oct2022 09:00AM       Accession: [id number]

## 2023-05-11 LAB
ALBUMIN SERPL ELPH-MCNC: 4.8 G/DL
ALP BLD-CCNC: 101 U/L
ALT SERPL-CCNC: 57 U/L
ANION GAP SERPL CALC-SCNC: 10 MMOL/L
AST SERPL-CCNC: 38 U/L
BASOPHILS # BLD AUTO: 0.05 K/UL
BASOPHILS NFR BLD AUTO: 1.1 %
BILIRUB SERPL-MCNC: 0.5 MG/DL
BUN SERPL-MCNC: 16 MG/DL
CALCIUM SERPL-MCNC: 9.5 MG/DL
CALCIUM SERPL-MCNC: 9.5 MG/DL
CHLORIDE SERPL-SCNC: 103 MMOL/L
CO2 SERPL-SCNC: 27 MMOL/L
CREAT SERPL-MCNC: 0.85 MG/DL
CRP SERPL-MCNC: <3 MG/L
EGFR: 79 ML/MIN/1.73M2
EOSINOPHIL # BLD AUTO: 0.16 K/UL
EOSINOPHIL NFR BLD AUTO: 3.4 %
ERYTHROCYTE [SEDIMENTATION RATE] IN BLOOD BY WESTERGREN METHOD: 13 MM/HR
GLUCOSE SERPL-MCNC: 92 MG/DL
HCT VFR BLD CALC: 45.3 %
HGB BLD-MCNC: 14.7 G/DL
IMM GRANULOCYTES NFR BLD AUTO: 0.2 %
LYMPHOCYTES # BLD AUTO: 1.67 K/UL
LYMPHOCYTES NFR BLD AUTO: 35.5 %
MAGNESIUM SERPL-MCNC: 2.4 MG/DL
MAN DIFF?: NORMAL
MCHC RBC-ENTMCNC: 31 PG
MCHC RBC-ENTMCNC: 32.5 GM/DL
MCV RBC AUTO: 95.6 FL
MONOCYTES # BLD AUTO: 0.38 K/UL
MONOCYTES NFR BLD AUTO: 8.1 %
NEUTROPHILS # BLD AUTO: 2.43 K/UL
NEUTROPHILS NFR BLD AUTO: 51.7 %
PARATHYROID HORMONE INTACT: 57 PG/ML
PHOSPHATE SERPL-MCNC: 3.2 MG/DL
PLATELET # BLD AUTO: 284 K/UL
POTASSIUM SERPL-SCNC: 5.4 MMOL/L
PROT SERPL-MCNC: 7.7 G/DL
RBC # BLD: 4.74 M/UL
RBC # FLD: 12.9 %
SODIUM SERPL-SCNC: 140 MMOL/L
WBC # FLD AUTO: 4.7 K/UL

## 2023-05-18 ENCOUNTER — APPOINTMENT (OUTPATIENT)
Dept: UROLOGY | Facility: CLINIC | Age: 58
End: 2023-05-18
Payer: COMMERCIAL

## 2023-05-18 VITALS
OXYGEN SATURATION: 97 % | HEART RATE: 56 BPM | TEMPERATURE: 98 F | DIASTOLIC BLOOD PRESSURE: 86 MMHG | SYSTOLIC BLOOD PRESSURE: 143 MMHG

## 2023-05-18 DIAGNOSIS — R35.0 FREQUENCY OF MICTURITION: ICD-10-CM

## 2023-05-18 PROCEDURE — 99204 OFFICE O/P NEW MOD 45 MIN: CPT

## 2023-05-19 LAB
BILIRUB UR QL STRIP: NORMAL
CLARITY UR: CLEAR
COLLECTION METHOD: NORMAL
GLUCOSE UR-MCNC: NORMAL
HCG UR QL: 0.2 EU/DL
HGB UR QL STRIP.AUTO: NORMAL
KETONES UR-MCNC: NORMAL
LEUKOCYTE ESTERASE UR QL STRIP: NORMAL
NITRITE UR QL STRIP: NORMAL
PH UR STRIP: 7
PROT UR STRIP-MCNC: NORMAL
SP GR UR STRIP: NORMAL

## 2023-05-19 NOTE — PHYSICAL EXAM
[General Appearance - Well Developed] : well developed [General Appearance - Well Nourished] : well nourished [Normal Appearance] : normal appearance [Well Groomed] : well groomed [General Appearance - In No Acute Distress] : no acute distress [Edema] : no peripheral edema [Respiration, Rhythm And Depth] : normal respiratory rhythm and effort [Exaggerated Use Of Accessory Muscles For Inspiration] : no accessory muscle use [Normal Station and Gait] : the gait and station were normal for the patient's age [] : no rash [Oriented To Time, Place, And Person] : oriented to person, place, and time [Affect] : the affect was normal [Mood] : the mood was normal [Not Anxious] : not anxious [FreeTextEntry1] : -CST. -UH. -Prolapse. Hypertonus levators.

## 2023-05-19 NOTE — ASSESSMENT
[FreeTextEntry1] : \par Impression/plan: 58 year-old female mixed urinary incontinence, failed first and second line tx. \par \par PVR 0 ml\par \par 1. UCx: r/o UTI. \par 2. F/u UDS: assess bladder function.\par 3. Discussed behavioral modification, decrease caffeine, hot/spicy foods, acidic and citrus food and drink intake. \par \par I, Dr. Albert Brown, personally performed the evaluation and management (E/M) services for this new patient.  That E/M includes conducting the clinically appropriate initial history &/or exam, assessing all conditions, and establishing the plan of care.  Today, my YURY Eleanor Calderon, was here to observe &/or participate in the visit & follow plan of care established by me.\par \par \par \par

## 2023-05-19 NOTE — LETTER BODY
[Dear  ___] : Dear  [unfilled], [Consult Letter:] : I had the pleasure of evaluating your patient, [unfilled]. [Please see my note below.] : Please see my note below. [Consult Closing:] : Thank you very much for allowing me to participate in the care of this patient.  If you have any questions, please do not hesitate to contact me. [Sincerely,] : Sincerely, [FreeTextEntry3] : Albert rBown MD\par System Director Urogynecology/[facility]\par Department of Urology\par [facility] \par   at [facility]\par  of Urology\par [employer] [facility] [employer] at [facility]\par

## 2023-05-22 ENCOUNTER — NON-APPOINTMENT (OUTPATIENT)
Age: 58
End: 2023-05-22

## 2023-05-22 LAB — BACTERIA UR CULT: NORMAL

## 2023-06-15 ENCOUNTER — APPOINTMENT (OUTPATIENT)
Dept: OBGYN | Facility: CLINIC | Age: 58
End: 2023-06-15
Payer: COMMERCIAL

## 2023-06-15 VITALS
DIASTOLIC BLOOD PRESSURE: 81 MMHG | WEIGHT: 127 LBS | HEIGHT: 62 IN | BODY MASS INDEX: 23.37 KG/M2 | SYSTOLIC BLOOD PRESSURE: 132 MMHG | OXYGEN SATURATION: 98 % | HEART RATE: 81 BPM

## 2023-06-15 DIAGNOSIS — N95.2 POSTMENOPAUSAL ATROPHIC VAGINITIS: ICD-10-CM

## 2023-06-15 PROCEDURE — 99396 PREV VISIT EST AGE 40-64: CPT

## 2023-06-15 NOTE — HISTORY OF PRESENT ILLNESS
[postmenopausal] : postmenopausal [N] : Patient does not use contraception [Y] : Positive pregnancy history [Menopause Age: ____] : age at menopause was [unfilled] [Currently Active] : currently active [Men] : men [No] : No [Patient reported mammogram was normal] : Patient reported mammogram was normal [Patient reported breast sonogram was normal] : Patient reported breast sonogram was normal [Patient reported PAP Smear was normal] : Patient reported PAP Smear was normal [LMP unknown] : LMP unknown [FreeTextEntry1] : Patient presents for annual visit without complains, doing well. Patient states occasionally she has vaginal itchiness that comes and goes. No other complaints. [Mammogramdate] : 01/16/23 [BreastSonogramDate] : 01/16/23 [PapSmeardate] : 10/20/20 [BoneDensityDate] : 10/17/22 [TextBox_37] : Osteoperosis [PGHxTotal] : 4 [[facility] Center[facility]Living] : 2

## 2023-06-15 NOTE — PHYSICAL EXAM
[Chaperone Present] : A chaperone was present in the examining room during all aspects of the physical examination [Appropriately responsive] : appropriately responsive [Alert] : alert [No Acute Distress] : no acute distress [No Lymphadenopathy] : no lymphadenopathy [Soft] : soft [Non-tender] : non-tender [Non-distended] : non-distended [Oriented x3] : oriented x3 [Examination Of The Breasts] : a normal appearance [No Discharge] : no discharge [No Masses] : no breast masses were palpable [Labia Majora] : normal [Labia Minora] : normal [Normal] : normal [No Tenderness] : no tenderness [Nl Sphincter Tone] : normal sphincter tone [Retroversion] : retroverted [Uterine Adnexae] : normal [FreeTextEntry9] : no nodules, no masses

## 2023-06-15 NOTE — DISCUSSION/SUMMARY
[FreeTextEntry1] : 57 yo patient presents for well women visit, doing well \par \par HCM:PAP and HPV done, breast imaging up to date, Colon cancer screening up to date.\par \par -f/u 1 year/PRN \par \par All questions and concerns addressed, patient expressed understanding. Encouraged to contact the office with any questions or concerns.

## 2023-06-16 LAB — HPV HIGH+LOW RISK DNA PNL CVX: NOT DETECTED

## 2023-06-20 ENCOUNTER — TRANSCRIPTION ENCOUNTER (OUTPATIENT)
Age: 58
End: 2023-06-20

## 2023-06-20 LAB — CYTOLOGY CVX/VAG DOC THIN PREP: ABNORMAL

## 2023-06-23 ENCOUNTER — APPOINTMENT (OUTPATIENT)
Dept: UROLOGY | Facility: CLINIC | Age: 58
End: 2023-06-23

## 2023-07-05 ENCOUNTER — APPOINTMENT (OUTPATIENT)
Dept: BREAST CENTER | Facility: CLINIC | Age: 58
End: 2023-07-05

## 2023-07-05 ENCOUNTER — OUTPATIENT (OUTPATIENT)
Dept: OUTPATIENT SERVICES | Facility: HOSPITAL | Age: 58
LOS: 1 days | End: 2023-07-05

## 2023-07-05 ENCOUNTER — APPOINTMENT (OUTPATIENT)
Dept: MRI IMAGING | Facility: CLINIC | Age: 58
End: 2023-07-05
Payer: COMMERCIAL

## 2023-07-05 DIAGNOSIS — S62.102A FRACTURE OF UNSPECIFIED CARPAL BONE, LEFT WRIST, INITIAL ENCOUNTER FOR CLOSED FRACTURE: Chronic | ICD-10-CM

## 2023-07-05 PROCEDURE — 77049 MRI BREAST C-+ W/CAD BI: CPT | Mod: 26

## 2023-07-07 ENCOUNTER — APPOINTMENT (OUTPATIENT)
Dept: BREAST CENTER | Facility: CLINIC | Age: 58
End: 2023-07-07
Payer: COMMERCIAL

## 2023-07-07 ENCOUNTER — RESULT REVIEW (OUTPATIENT)
Age: 58
End: 2023-07-07

## 2023-07-07 VITALS
DIASTOLIC BLOOD PRESSURE: 69 MMHG | HEART RATE: 67 BPM | WEIGHT: 122 LBS | HEIGHT: 62 IN | BODY MASS INDEX: 22.45 KG/M2 | SYSTOLIC BLOOD PRESSURE: 137 MMHG

## 2023-07-07 PROCEDURE — 99213 OFFICE O/P EST LOW 20 MIN: CPT

## 2023-07-07 NOTE — PHYSICAL EXAM
[Normocephalic] : normocephalic [Supple] : supple [No Supraclavicular Adenopathy] : no supraclavicular adenopathy [Examined in the supine and seated position] : examined in the supine and seated position [Symmetrical] : symmetrical [No dominant masses] : no dominant masses in right breast  [No dominant masses] : no dominant masses left breast [No Nipple Retraction] : no left nipple retraction [No Nipple Discharge] : no left nipple discharge [No Axillary Lymphadenopathy] : no left axillary lymphadenopathy [No Edema] : no edema [No Rashes] : no rashes [No Ulceration] : no ulceration [Breast Nipple Inversion] : nipples not inverted [Breast Nipple Retraction] : nipples not retracted [Breast Nipple Flattening] : nipples not flattened [Breast Nipple Fissures] : nipples not fissured [de-identified] : circumareolar incision, well healed

## 2023-07-07 NOTE — HISTORY OF PRESENT ILLNESS
[FreeTextEntry1] : Samuel is a 58 year old female presents for high risk screening with a h/o left breast LCIS and ALH, s/p left excisional biopsy on 1/3/19. MILENA of 49%. Pt denies palpable masses, skin lesions/changes, nipple discharge, or other breast complaints. She notes occasional tenderness to her right axilla. 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 \par \par Her OB/GYN, Dr. Palma.\par \par She met with [facility] in the past to discuss risk reduction therapy and it she decided against AI.

## 2023-07-07 NOTE — DATA REVIEWED
[FreeTextEntry1] : --7/8/19 b/l mammo/US: no mammographic or sonographic evidence of malignancy, BIRADS-2 benign findings. \par \par --8/15/19 ([facility]) MRI breasts: dense breast tissue. Right breast, 0.5 cm enhancing lesion at 1n2.7, likely benign but not seen on prior imaging so targeted US with biopsy is recommended, vs MRI biopsy. BI-RADS 4. Left breast benign MRI findings. Chest: 1.3 cm area in the right middle lobe, follow-up CT scan may be indicated. \par \par --9/20/19 ([facility]) biopsy: right breast 1n2.7, benign breast tissue. Concordant, f/u MRI is recommended in one year. 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 \par \par -- 7/6/2020 ([facility]) b/l mmg: heterogenously dense breasts (of note-- previous was denoted scattered areas of fibroglandular density). No suspicious findings. BI-RADS 2. \par \par -- 12/8/20 ([facility]) MRI breasts: RIGHT BREAST: 1. New 1.2 x 0.6 x 0.7 cm enhancing lesion 12:00 position, 3.5 cm from the nipple with indeterminate enhancement kinetics. Biopsy is recommended. Right breast mammogram and ultrasound are recommended to determine if it is amenable to ultrasound or stereotactic biopsy. Otherwise, MRI biopsy is recommended. 2. 0.4 cm subthreshold focus of non-mass enhancement at the 12:00 position, 4.5 cm from the nipple new from prior study. This can also be evaluated on mammogram and ultrasound. If it is negative, additional biopsies can be based on pathology. 3. 0.5 cm enhancing lesion at the 1:00 position, 2.7 cm from the nipple with indeterminate enhancement kinetics, delayed plateau type enhancement, unchanged in size but previously had benign enhancement kinetics. The microclip from previous biopsy is not associated with the lesion. Right breast mammogram and ultrasound are recommended to determine if it is amenable to ultrasound or stereotactic biopsy. Otherwise, MRI biopsy is recommended. LEFT BREAST: 0.5 cm enhancing lesion at the 5:30 axis, 4.5 cm from the nipple with benign enhancement kinetics, but new from prior study. Biopsy is recommended. Left breast mammogram and ultrasound are recommended to determine if it is amenable to ultrasound or stereotactic biopsy. Otherwise, MRI biopsy is recommended. BI-RADS 4B\par \par -- 12/14/20 ([facility]) b/l mmg & US: No mammographic abnormalities are seen to correspond with the enhancing lesions seen on MRI. Bilateral MRI guided biopsies are recommended 2. 1.2 x 0.4 cm ill-defined mildly hypoechoic lesion 11-12:00 right breast, 3.5 cm from the nipple possibly corresponding with the enhancing lesion seen on MRI. However, it is better visualized on MRI and a 0.7 cm enhancing lesion 1:00 position also needs right breast MRI biopsy. Negative left breast ultrasound. No sonographic abnormalities seen to correspond with the 0.5 cm enhancing lesion at the 5:30 axis. Therefore, bilateral MRI biopsies recommended. BI-RADS 4B\par \par -- 12/22/20 ([facility]) MRI biopsies: left 5:30 4.5cmFN benign breast tissue with fibroadenomatoid and fibrocystic changes, rare calcifications associated with benign epithelium. Right 12:00 3.5cmFN predominantly fatty tissue with rare skin adnexal glands, right 1:00 2.7cmFN predominantly fatty benign breast tissue with fibroadenoma \par \par -- 12/22/20 ([facility]) MRI biopsies: left 5:30 4.5cmFN benign breast tissue with fibroadenomatoid and fibrocystic changes, rare calcifications associated with benign epithelium. Right 12:00 3.5cmFN predominantly fatty tissue with rare skin adnexal glands, right 1:00 2.7cmFN predominantly fatty benign breast tissue with fibroadenoma. \par \par -- 6/1/21 ([facility]) B/l breast US:No sonographic evidence of malignancy. Please note, on addendum to MR guided biopsy performed 12/22/2020, patient was recommended to resume annual mammogram in December 2021 and follow-up with MR also to be scheduled for December 2021. BI-RADS 1. \par \par 12/27/2021 ([facility]) Mmg & US: scattered fbg density. 0.4cm mass in the right breast at 11:00 for which targeted US is recommended. BI-RADS 0.\par \par 1/5/22 ([facility]) right breast US: indeterminate finding in the right breast at 11:00. US bx is recommended. BI-RADS 4A.\par \par 1/14/22 ([facility]) right breast 11n3, US bx: non-proliferative change; sclerosing adenosis. Benign & concordant. Rec US in 1 year. \par \par 1/18/22 ([facility]) B/l screening mammo and US: scattered fbg density. No evidence of malignancy. BI-RADS 2. \par \par 7/5/23 ([facility]) MRI: No suspicious enhancement to warrant biopsy. BI-RADS 2

## 2023-07-07 NOTE — ASSESSMENT
[FreeTextEntry1] : Samuel is a 58 year old post-menopausal female with a h/o left breast LCIS and ALH, s/p left excisional biopsy on 1/3/19. MILENA of 49%. Genetic testing revealed results showing a VUS in CHECK2 and PTCH1. Discussed with the patient the implications of their lifetime risk, which is considered to be elevated for the development of breast cancer over the span of their lifetime. It was explained that it is recommended that they undergo high risk screening surveillance to include biannual radiological screening exams with a mammogram and screening MRI. \par \par Discussed etiologies of breast and axillary pain including hormonal changes, benign entities such as cysts, reactive lymph nodes, musculoskeletal issues and rarely malignancy. Recommended keeping a diary of when the breast pain comes on to narrow down the cause. Reviewed the use of OTC Ibuprofen, warm/cold compresses, stretching. The patient understands, will try these. \par \par She will be due for her screening mammo in January and will plan on RTC for another breast exam after that. The patient verbalized understanding and is in agreement with the plan. \par

## 2023-07-11 ENCOUNTER — APPOINTMENT (OUTPATIENT)
Dept: ORTHOPEDIC SURGERY | Facility: CLINIC | Age: 58
End: 2023-07-11
Payer: COMMERCIAL

## 2023-07-11 ENCOUNTER — OUTPATIENT (OUTPATIENT)
Dept: OUTPATIENT SERVICES | Facility: HOSPITAL | Age: 58
LOS: 1 days | End: 2023-07-11
Payer: COMMERCIAL

## 2023-07-11 ENCOUNTER — RESULT REVIEW (OUTPATIENT)
Age: 58
End: 2023-07-11

## 2023-07-11 ENCOUNTER — APPOINTMENT (OUTPATIENT)
Dept: RADIOLOGY | Facility: CLINIC | Age: 58
End: 2023-07-11

## 2023-07-11 VITALS
HEIGHT: 62 IN | SYSTOLIC BLOOD PRESSURE: 141 MMHG | DIASTOLIC BLOOD PRESSURE: 91 MMHG | HEART RATE: 61 BPM | OXYGEN SATURATION: 99 % | RESPIRATION RATE: 16 BRPM | WEIGHT: 122 LBS | BODY MASS INDEX: 22.45 KG/M2

## 2023-07-11 DIAGNOSIS — S62.102A FRACTURE OF UNSPECIFIED CARPAL BONE, LEFT WRIST, INITIAL ENCOUNTER FOR CLOSED FRACTURE: Chronic | ICD-10-CM

## 2023-07-11 PROCEDURE — 99203 OFFICE O/P NEW LOW 30 MIN: CPT

## 2023-07-11 PROCEDURE — 73630 X-RAY EXAM OF FOOT: CPT | Mod: 26,LT

## 2023-07-24 ENCOUNTER — APPOINTMENT (OUTPATIENT)
Dept: ORTHOPEDIC SURGERY | Facility: CLINIC | Age: 58
End: 2023-07-24
Payer: COMMERCIAL

## 2023-07-24 ENCOUNTER — OUTPATIENT (OUTPATIENT)
Dept: OUTPATIENT SERVICES | Facility: HOSPITAL | Age: 58
LOS: 1 days | End: 2023-07-24
Payer: COMMERCIAL

## 2023-07-24 ENCOUNTER — APPOINTMENT (OUTPATIENT)
Dept: RADIOLOGY | Facility: CLINIC | Age: 58
End: 2023-07-24

## 2023-07-24 ENCOUNTER — RESULT REVIEW (OUTPATIENT)
Age: 58
End: 2023-07-24

## 2023-07-24 VITALS
RESPIRATION RATE: 16 BRPM | SYSTOLIC BLOOD PRESSURE: 138 MMHG | WEIGHT: 122 LBS | OXYGEN SATURATION: 99 % | DIASTOLIC BLOOD PRESSURE: 85 MMHG | HEART RATE: 59 BPM | BODY MASS INDEX: 22.45 KG/M2 | HEIGHT: 62 IN

## 2023-07-24 DIAGNOSIS — S93.492A SPRAIN OF OTHER LIGAMENT OF LEFT ANKLE, INITIAL ENCOUNTER: ICD-10-CM

## 2023-07-24 DIAGNOSIS — S62.102A FRACTURE OF UNSPECIFIED CARPAL BONE, LEFT WRIST, INITIAL ENCOUNTER FOR CLOSED FRACTURE: Chronic | ICD-10-CM

## 2023-07-24 PROCEDURE — 99213 OFFICE O/P EST LOW 20 MIN: CPT

## 2023-07-24 PROCEDURE — 73630 X-RAY EXAM OF FOOT: CPT | Mod: 26,LT

## 2023-08-08 NOTE — HISTORY OF PRESENT ILLNESS
[de-identified] : The patient is a 58 year old RIGHT/LEFT hand dominant female who presents today for follow up evaluation of her left foot pain. She says  Since last visit he/she/they/them has tried:  ( ) rest, ( ) activity modification, ( ) bracing/taping, ( ) Orthotic, ( ) formal therapy, ( ) Home exercise program, ( )NSAIDs/Tylenol    Today they are complaining of INJURED SIDE, EXTREMITY, PAIN/INSTABILITY/?.     Date of Injury/Onset: _  Pain:    At Rest: 0-10/10   With Activity:  0-10/10   Mechanism of injury: ACTIVITY/EVENT/Insidious Onset   This is NOT a Work Related Injury being treated under Worker's Compensation.  This is NOT an athletic injury occurring associated with an interscholastic or organized sports team.  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

## 2023-08-09 ENCOUNTER — APPOINTMENT (OUTPATIENT)
Dept: ORTHOPEDIC SURGERY | Facility: CLINIC | Age: 58
End: 2023-08-09

## 2023-08-21 ENCOUNTER — RX RENEWAL (OUTPATIENT)
Age: 58
End: 2023-08-21

## 2023-09-25 ENCOUNTER — RX RENEWAL (OUTPATIENT)
Age: 58
End: 2023-09-25

## 2024-01-17 ENCOUNTER — RESULT REVIEW (OUTPATIENT)
Age: 59
End: 2024-01-17

## 2024-01-17 ENCOUNTER — APPOINTMENT (OUTPATIENT)
Dept: MAMMOGRAPHY | Facility: IMAGING CENTER | Age: 59
End: 2024-01-17
Payer: COMMERCIAL

## 2024-01-17 ENCOUNTER — OUTPATIENT (OUTPATIENT)
Dept: OUTPATIENT SERVICES | Facility: HOSPITAL | Age: 59
LOS: 1 days | End: 2024-01-17
Payer: COMMERCIAL

## 2024-01-17 DIAGNOSIS — S62.102A FRACTURE OF UNSPECIFIED CARPAL BONE, LEFT WRIST, INITIAL ENCOUNTER FOR CLOSED FRACTURE: Chronic | ICD-10-CM

## 2024-01-17 DIAGNOSIS — Z00.8 ENCOUNTER FOR OTHER GENERAL EXAMINATION: ICD-10-CM

## 2024-01-17 DIAGNOSIS — Z00.00 ENCOUNTER FOR GENERAL ADULT MEDICAL EXAMINATION WITHOUT ABNORMAL FINDINGS: ICD-10-CM

## 2024-01-17 PROCEDURE — 76642 ULTRASOUND BREAST LIMITED: CPT

## 2024-01-17 PROCEDURE — G0279: CPT

## 2024-01-17 PROCEDURE — 77066 DX MAMMO INCL CAD BI: CPT

## 2024-01-17 PROCEDURE — G0279: CPT | Mod: 26

## 2024-01-17 PROCEDURE — 77066 DX MAMMO INCL CAD BI: CPT | Mod: 26

## 2024-01-17 PROCEDURE — 77062 BREAST TOMOSYNTHESIS BI: CPT | Mod: 26

## 2024-01-17 PROCEDURE — 76642 ULTRASOUND BREAST LIMITED: CPT | Mod: 26,LT

## 2024-01-26 ENCOUNTER — APPOINTMENT (OUTPATIENT)
Dept: BREAST CENTER | Facility: CLINIC | Age: 59
End: 2024-01-26
Payer: COMMERCIAL

## 2024-01-26 VITALS
HEIGHT: 62 IN | BODY MASS INDEX: 22.45 KG/M2 | HEART RATE: 62 BPM | SYSTOLIC BLOOD PRESSURE: 127 MMHG | DIASTOLIC BLOOD PRESSURE: 75 MMHG | WEIGHT: 122 LBS

## 2024-01-26 DIAGNOSIS — Z86.000 PERSONAL HISTORY OF IN-SITU NEOPLASM OF BREAST: ICD-10-CM

## 2024-01-26 DIAGNOSIS — Z91.89 OTHER SPECIFIED PERSONAL RISK FACTORS, NOT ELSEWHERE CLASSIFIED: ICD-10-CM

## 2024-01-26 DIAGNOSIS — Z80.3 FAMILY HISTORY OF MALIGNANT NEOPLASM OF BREAST: ICD-10-CM

## 2024-01-26 DIAGNOSIS — N64.4 MASTODYNIA: ICD-10-CM

## 2024-01-26 PROCEDURE — 99213 OFFICE O/P EST LOW 20 MIN: CPT

## 2024-01-26 NOTE — HISTORY OF PRESENT ILLNESS
[FreeTextEntry1] : Samuel is a 58 year old female presents for high-risk screening with a h/o left breast LCIS and ALH, s/p left excisional biopsy on 1/3/19. MILENA of 49%. Pt denies palpable masses, skin lesions/changes, nipple discharge. She reports intermittent left breast pain; she is hesitant to take pain medication as her friend  of liver complications relation to medication use.   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   Her OB/GYN, Dr. Palma.  She met with [facility] in the past to discuss risk reduction therapy and it she decided against AI.

## 2024-01-26 NOTE — ASSESSMENT
[FreeTextEntry1] : 59 yo female presents for high risk screening in the setting of a family history of breast cancer and a personal history of high risk lesions.   Reviewed the benign results of her mammogram and ultrasound from last week.   Discussed with the patient the implications of their lifetime risk, which is considered to be elevated for the development of breast cancer over the span of their lifetime. It was explained that it is recommended that they undergo high risk screening surveillance to include biannual radiological screening exams with a mammogram and screening MRI.   Discussed etiologies of breast pain including hormonal changes, scar tissue, benign entities such as cysts, reactive lymph nodes, musculoskeletal issues and rarely malignancy. Reviewed the use of OTC Ibuprofen, warm/cold compresses, & Salon Pas patches.  Will plan on a MRI in July and follow-up in the office for another breast exam after imaging is complete.   All questions were answered; the patient verbalized understanding and is in agreement with the plan.

## 2024-01-26 NOTE — REVIEW OF SYSTEMS
[As Noted in HPI] : as noted in HPI [Negative] : Heme/Lymph [Breast Pain] : breast pain [Breast Lump] : no breast lump

## 2024-01-26 NOTE — PAST MEDICAL HISTORY
[Postmenopausal] : The patient is postmenopausal [Menarche Age ____] : age at menarche was [unfilled] [Menopause Age____] : age at menopause was [unfilled] [Approximately ___] : the LMP was approximately [unfilled] [Total Preg ___] : G[unfilled] [Live Births ___] : P[unfilled]  [Living ___] : Living: [unfilled] [AB Spont ___] : miscarriages: [unfilled]  [Age At Live Birth ___] : Age at live birth: [unfilled] [History of Hormone Replacement Treatment] : has no history of hormone replacement treatment [FreeTextEntry5] :  1995\par  Hysteroscopy w/ resection for intrauterine polyp removal\par  Tubal Ligation-  [FreeTextEntry7] : Depo x 2 years, stopped in 1998 [FreeTextEntry6] : None [FreeTextEntry8] : Breasfed x 5 months with each child

## 2024-01-26 NOTE — PHYSICAL EXAM
[Normocephalic] : normocephalic [Supple] : supple [No Supraclavicular Adenopathy] : no supraclavicular adenopathy [Examined in the supine and seated position] : examined in the supine and seated position [Symmetrical] : symmetrical [No dominant masses] : no dominant masses in right breast  [No dominant masses] : no dominant masses left breast [No Nipple Retraction] : no left nipple retraction [No Nipple Discharge] : no left nipple discharge [No Axillary Lymphadenopathy] : no left axillary lymphadenopathy [No Edema] : no edema [No Rashes] : no rashes [No Ulceration] : no ulceration [Breast Nipple Inversion] : nipples not inverted [Breast Nipple Retraction] : nipples not retracted [Breast Nipple Flattening] : nipples not flattened [Breast Nipple Fissures] : nipples not fissured [de-identified] : circumareolar incision, well healed

## 2024-01-26 NOTE — DATA REVIEWED
[FreeTextEntry1] : --7/8/19 b/l mammo/US: no mammographic or sonographic evidence of malignancy, BIRADS-2 benign findings.   --8/15/19 ([facility]) MRI breasts: dense breast tissue. Right breast, 0.5 cm enhancing lesion at 1n2.7, likely benign but not seen on prior imaging so targeted US with biopsy is recommended, vs MRI biopsy. BI-RADS 4. Left breast benign MRI findings. Chest: 1.3 cm area in the right middle lobe, follow-up CT scan may be indicated.   --9/20/19 ([facility]) biopsy: right breast 1n2.7, benign breast tissue. Concordant, f/u MRI is recommended in one year.   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   -- 7/6/2020 ([facility]) b/l mmg: heterogenously dense breasts (of note-- previous was denoted scattered areas of fibroglandular density). No suspicious findings. BI-RADS 2.   -- 12/8/20 ([facility]) MRI breasts: RIGHT BREAST: 1. New 1.2 x 0.6 x 0.7 cm enhancing lesion 12:00 position, 3.5 cm from the nipple with indeterminate enhancement kinetics. Biopsy is recommended. Right breast mammogram and ultrasound are recommended to determine if it is amenable to ultrasound or stereotactic biopsy. Otherwise, MRI biopsy is recommended. 2. 0.4 cm subthreshold focus of non-mass enhancement at the 12:00 position, 4.5 cm from the nipple new from prior study. This can also be evaluated on mammogram and ultrasound. If it is negative, additional biopsies can be based on pathology. 3. 0.5 cm enhancing lesion at the 1:00 position, 2.7 cm from the nipple with indeterminate enhancement kinetics, delayed plateau type enhancement, unchanged in size but previously had benign enhancement kinetics. The microclip from previous biopsy is not associated with the lesion. Right breast mammogram and ultrasound are recommended to determine if it is amenable to ultrasound or stereotactic biopsy. Otherwise, MRI biopsy is recommended. LEFT BREAST: 0.5 cm enhancing lesion at the 5:30 axis, 4.5 cm from the nipple with benign enhancement kinetics, but new from prior study. Biopsy is recommended. Left breast mammogram and ultrasound are recommended to determine if it is amenable to ultrasound or stereotactic biopsy. Otherwise, MRI biopsy is recommended. BI-RADS 4B  -- 12/14/20 ([facility]) b/l mmg & US: No mammographic abnormalities are seen to correspond with the enhancing lesions seen on MRI. Bilateral MRI guided biopsies are recommended 2. 1.2 x 0.4 cm ill-defined mildly hypoechoic lesion 11-12:00 right breast, 3.5 cm from the nipple possibly corresponding with the enhancing lesion seen on MRI. However, it is better visualized on MRI and a 0.7 cm enhancing lesion 1:00 position also needs right breast MRI biopsy. Negative left breast ultrasound. No sonographic abnormalities seen to correspond with the 0.5 cm enhancing lesion at the 5:30 axis. Therefore, bilateral MRI biopsies recommended. BI-RADS 4B  -- 12/22/20 ([facility]) MRI biopsies: left 5:30 4.5cmFN benign breast tissue with fibroadenomatoid and fibrocystic changes, rare calcifications associated with benign epithelium. Right 12:00 3.5cmFN predominantly fatty tissue with rare skin adnexal glands, right 1:00 2.7cmFN predominantly fatty benign breast tissue with fibroadenoma   -- 12/22/20 ([facility]) MRI biopsies: left 5:30 4.5cmFN benign breast tissue with fibroadenomatoid and fibrocystic changes, rare calcifications associated with benign epithelium. Right 12:00 3.5cmFN predominantly fatty tissue with rare skin adnexal glands, right 1:00 2.7cmFN predominantly fatty benign breast tissue with fibroadenoma.   -- 6/1/21 ([facility]) B/l breast US:No sonographic evidence of malignancy. Please note, on addendum to MR guided biopsy performed 12/22/2020, patient was recommended to resume annual mammogram in December 2021 and follow-up with MR also to be scheduled for December 2021. BI-RADS 1.   12/27/2021 ([facility]) Mmg & US: scattered fbg density. 0.4cm mass in the right breast at 11:00 for which targeted US is recommended. BI-RADS 0.  1/5/22 ([facility]) right breast US: indeterminate finding in the right breast at 11:00. US bx is recommended. BI-RADS 4A.  1/14/22 ([facility]) right breast 11n3, US bx: non-proliferative change; sclerosing adenosis. Benign & concordant. Rec US in 1 year.   1/18/22 ([facility]) B/l screening mammo and US: scattered fbg density. No evidence of malignancy. BI-RADS 2.   7/5/23 ([facility]) MRI: No suspicious enhancement to warrant biopsy. BI-RADS 2  1/17/24 ([facility]) left US and b/l diag mammo: scattered areas of fibroglandular density. No mammographic or sonographic evidence of malignancy. Further management of pain should be based clinically. BI-RADS 2.

## 2024-03-18 ENCOUNTER — APPOINTMENT (OUTPATIENT)
Dept: FAMILY MEDICINE | Facility: CLINIC | Age: 59
End: 2024-03-18
Payer: COMMERCIAL

## 2024-03-18 VITALS
WEIGHT: 124 LBS | BODY MASS INDEX: 22.82 KG/M2 | HEIGHT: 62 IN | DIASTOLIC BLOOD PRESSURE: 83 MMHG | SYSTOLIC BLOOD PRESSURE: 128 MMHG | TEMPERATURE: 97.4 F | HEART RATE: 67 BPM | OXYGEN SATURATION: 97 %

## 2024-03-18 DIAGNOSIS — Z00.00 ENCOUNTER FOR GENERAL ADULT MEDICAL EXAMINATION W/OUT ABNORMAL FINDINGS: ICD-10-CM

## 2024-03-18 DIAGNOSIS — M81.0 AGE-RELATED OSTEOPOROSIS W/OUT CURRENT PATHOLOGICAL FRACTURE: ICD-10-CM

## 2024-03-18 PROCEDURE — 99396 PREV VISIT EST AGE 40-64: CPT | Mod: 25

## 2024-03-18 PROCEDURE — 36415 COLL VENOUS BLD VENIPUNCTURE: CPT

## 2024-03-18 RX ORDER — MELOXICAM 10 MG/1
10 CAPSULE ORAL DAILY
Qty: 30 | Refills: 0 | Status: DISCONTINUED | COMMUNITY
Start: 2023-07-11 | End: 2024-03-18

## 2024-03-18 RX ORDER — MELOXICAM 7.5 MG/1
7.5 TABLET ORAL
Qty: 30 | Refills: 0 | Status: DISCONTINUED | COMMUNITY
Start: 2023-07-24 | End: 2024-03-18

## 2024-03-18 NOTE — HISTORY OF PRESENT ILLNESS
[FreeTextEntry1] : CPE [de-identified] : 57 yo female PMH osteoporosis, left breast LCIS and ALH, s/p left excisional biopsy presents for CPE.  Patient follows with breast surgeon Dr. Renu Tolbert, mammogram from Jan 2024 WNL. Patient also follows with gyn Dr. Palma, pap from June 2023 WNL. Patient follows with rheum Dr. Patterson for osteoporosis, last Dexa Oct 2022, due for repeat this Oct.  Patient states she last had colonoscopy around 2017, states it was WNL. Patient has seen uro Dr. Brown in May 2023 for nocturia, states it is still bothering her, has not scheduled follow up for urodynamic testing. Feeling well today.

## 2024-03-18 NOTE — PLAN
[FreeTextEntry1] : HCM -F/u bloodwork, call patient with results -Up to date on mammogram, pap smear, colonoscopy

## 2024-03-18 NOTE — PHYSICAL EXAM
[No Lymphadenopathy] : no lymphadenopathy [Supple] : supple [Coordination Grossly Intact] : coordination grossly intact [No Focal Deficits] : no focal deficits [Normal Gait] : normal gait [Normal] : affect was normal and insight and judgment were intact

## 2024-03-18 NOTE — HEALTH RISK ASSESSMENT
[Yes] : Yes [Monthly or less (1 pt)] : Monthly or less (1 point) [1 or 2 (0 pts)] : 1 or 2 (0 points) [Never (0 pts)] : Never (0 points) [No] : In the past 12 months have you used drugs other than those required for medical reasons? No [No falls in past year] : Patient reported no falls in the past year [0] : 2) Feeling down, depressed, or hopeless: Not at all (0) [PHQ-2 Negative - No further assessment needed] : PHQ-2 Negative - No further assessment needed [Patient reported mammogram was normal] : Patient reported mammogram was normal [Patient reported PAP Smear was normal] : Patient reported PAP Smear was normal [Patient reported bone density results were abnormal] : Patient reported bone density results were abnormal [Patient reported colonoscopy was normal] : Patient reported colonoscopy was normal [With Family] : lives with family [None] : None [Fully functional (bathing, dressing, toileting, transferring, walking, feeding)] : Fully functional (bathing, dressing, toileting, transferring, walking, feeding) [Employed] : employed [Fully functional (using the telephone, shopping, preparing meals, housekeeping, doing laundry, using] : Fully functional and needs no help or supervision to perform IADLs (using the telephone, shopping, preparing meals, housekeeping, doing laundry, using transportation, managing medications and managing finances) [Never] : Never [Audit-CScore] : 1 [DWL3Ulwov] : 0 [HIV test declined] : HIV test declined [Hepatitis C test declined] : Hepatitis C test declined [Change in mental status noted] : No change in mental status noted [MammogramDate] : 01/24 [PapSmearDate] : 06/23 [BoneDensityDate] : 10/22 [BoneDensityComments] : osteoporosis [ColonoscopyDate] : 01/17 [FreeTextEntry2] : Home

## 2024-03-18 NOTE — REVIEW OF SYSTEMS
[Negative] : Psychiatric [Dysuria] : no dysuria [Incontinence] : no incontinence [Nocturia] : nocturia [Hematuria] : no hematuria [Frequency] : no frequency

## 2024-03-19 LAB
25(OH)D3 SERPL-MCNC: 47.1 NG/ML
ALBUMIN SERPL ELPH-MCNC: 4.3 G/DL
ALP BLD-CCNC: 89 U/L
ALT SERPL-CCNC: 31 U/L
ANION GAP SERPL CALC-SCNC: 9 MMOL/L
AST SERPL-CCNC: 35 U/L
BASOPHILS # BLD AUTO: 0.03 K/UL
BASOPHILS NFR BLD AUTO: 0.7 %
BILIRUB SERPL-MCNC: 0.5 MG/DL
BUN SERPL-MCNC: 13 MG/DL
CALCIUM SERPL-MCNC: 9.2 MG/DL
CHLORIDE SERPL-SCNC: 104 MMOL/L
CHOLEST SERPL-MCNC: 209 MG/DL
CO2 SERPL-SCNC: 28 MMOL/L
CREAT SERPL-MCNC: 0.82 MG/DL
EGFR: 83 ML/MIN/1.73M2
EOSINOPHIL # BLD AUTO: 0.13 K/UL
EOSINOPHIL NFR BLD AUTO: 3 %
ESTIMATED AVERAGE GLUCOSE: 105 MG/DL
GLUCOSE SERPL-MCNC: 96 MG/DL
HBA1C MFR BLD HPLC: 5.3 %
HCT VFR BLD CALC: 43.9 %
HDLC SERPL-MCNC: 57 MG/DL
HGB BLD-MCNC: 14.2 G/DL
IMM GRANULOCYTES NFR BLD AUTO: 0.2 %
LDLC SERPL CALC-MCNC: 127 MG/DL
LYMPHOCYTES # BLD AUTO: 1.84 K/UL
LYMPHOCYTES NFR BLD AUTO: 42.5 %
MAN DIFF?: NORMAL
MCHC RBC-ENTMCNC: 30.1 PG
MCHC RBC-ENTMCNC: 32.3 GM/DL
MCV RBC AUTO: 93.2 FL
MONOCYTES # BLD AUTO: 0.32 K/UL
MONOCYTES NFR BLD AUTO: 7.4 %
NEUTROPHILS # BLD AUTO: 2 K/UL
NEUTROPHILS NFR BLD AUTO: 46.2 %
NONHDLC SERPL-MCNC: 153 MG/DL
PLATELET # BLD AUTO: 279 K/UL
POTASSIUM SERPL-SCNC: 4.7 MMOL/L
PROT SERPL-MCNC: 7 G/DL
RBC # BLD: 4.71 M/UL
RBC # FLD: 12.9 %
SODIUM SERPL-SCNC: 141 MMOL/L
TRIGL SERPL-MCNC: 147 MG/DL
TSH SERPL-ACNC: 0.75 UIU/ML
WBC # FLD AUTO: 4.33 K/UL

## 2024-06-27 ENCOUNTER — APPOINTMENT (OUTPATIENT)
Dept: OBGYN | Facility: CLINIC | Age: 59
End: 2024-06-27
Payer: COMMERCIAL

## 2024-06-27 VITALS
BODY MASS INDEX: 21.9 KG/M2 | DIASTOLIC BLOOD PRESSURE: 87 MMHG | WEIGHT: 119 LBS | HEART RATE: 55 BPM | HEIGHT: 62 IN | OXYGEN SATURATION: 98 % | SYSTOLIC BLOOD PRESSURE: 137 MMHG

## 2024-06-27 DIAGNOSIS — R94.6 ABNORMAL RESULTS OF THYROID FUNCTION STUDIES: ICD-10-CM

## 2024-06-27 DIAGNOSIS — Z01.419 ENCOUNTER FOR GYNECOLOGICAL EXAMINATION (GENERAL) (ROUTINE) W/OUT ABNORMAL FINDINGS: ICD-10-CM

## 2024-06-27 DIAGNOSIS — R93.89 ABNORMAL FINDINGS ON DIAGNOSTIC IMAGING OF OTHER SPECIFIED BODY STRUCTURES: ICD-10-CM

## 2024-06-27 DIAGNOSIS — E04.1 NONTOXIC SINGLE THYROID NODULE: ICD-10-CM

## 2024-06-27 PROCEDURE — 99396 PREV VISIT EST AGE 40-64: CPT

## 2024-07-01 ENCOUNTER — NON-APPOINTMENT (OUTPATIENT)
Age: 59
End: 2024-07-01

## 2024-07-01 PROBLEM — Z01.419 WELL WOMAN EXAM WITH ROUTINE GYNECOLOGICAL EXAM: Status: ACTIVE | Noted: 2017-01-10

## 2024-07-01 LAB — HPV HIGH+LOW RISK DNA PNL CVX: NOT DETECTED

## 2024-07-02 LAB — CYTOLOGY CVX/VAG DOC THIN PREP: ABNORMAL

## 2024-07-03 ENCOUNTER — TRANSCRIPTION ENCOUNTER (OUTPATIENT)
Age: 59
End: 2024-07-03

## 2024-07-05 ENCOUNTER — APPOINTMENT (OUTPATIENT)
Dept: BREAST CENTER | Facility: CLINIC | Age: 59
End: 2024-07-05

## 2024-07-18 ENCOUNTER — APPOINTMENT (OUTPATIENT)
Dept: MRI IMAGING | Facility: IMAGING CENTER | Age: 59
End: 2024-07-18
Payer: COMMERCIAL

## 2024-07-18 ENCOUNTER — OUTPATIENT (OUTPATIENT)
Dept: OUTPATIENT SERVICES | Facility: HOSPITAL | Age: 59
LOS: 1 days | End: 2024-07-18
Payer: COMMERCIAL

## 2024-07-18 DIAGNOSIS — Z91.89 OTHER SPECIFIED PERSONAL RISK FACTORS, NOT ELSEWHERE CLASSIFIED: ICD-10-CM

## 2024-07-18 DIAGNOSIS — S62.102A FRACTURE OF UNSPECIFIED CARPAL BONE, LEFT WRIST, INITIAL ENCOUNTER FOR CLOSED FRACTURE: Chronic | ICD-10-CM

## 2024-07-18 DIAGNOSIS — Z00.8 ENCOUNTER FOR OTHER GENERAL EXAMINATION: ICD-10-CM

## 2024-07-18 PROCEDURE — C8908: CPT

## 2024-07-18 PROCEDURE — 77049 MRI BREAST C-+ W/CAD BI: CPT | Mod: 26

## 2024-07-18 PROCEDURE — A9585: CPT

## 2024-07-18 PROCEDURE — C8937: CPT

## 2024-07-22 ENCOUNTER — APPOINTMENT (OUTPATIENT)
Dept: ULTRASOUND IMAGING | Facility: IMAGING CENTER | Age: 59
End: 2024-07-22
Payer: COMMERCIAL

## 2024-07-22 PROCEDURE — 76536 US EXAM OF HEAD AND NECK: CPT | Mod: 26

## 2024-07-25 ENCOUNTER — APPOINTMENT (OUTPATIENT)
Dept: BREAST CENTER | Facility: CLINIC | Age: 59
End: 2024-07-25
Payer: COMMERCIAL

## 2024-07-25 VITALS — HEIGHT: 62 IN | BODY MASS INDEX: 22.82 KG/M2 | WEIGHT: 124 LBS

## 2024-07-25 DIAGNOSIS — Z80.8 FAMILY HISTORY OF MALIGNANT NEOPLASM OF OTHER ORGANS OR SYSTEMS: ICD-10-CM

## 2024-07-25 DIAGNOSIS — Z91.89 OTHER SPECIFIED PERSONAL RISK FACTORS, NOT ELSEWHERE CLASSIFIED: ICD-10-CM

## 2024-07-25 DIAGNOSIS — Z86.000 PERSONAL HISTORY OF IN-SITU NEOPLASM OF BREAST: ICD-10-CM

## 2024-07-25 PROCEDURE — 99213 OFFICE O/P EST LOW 20 MIN: CPT

## 2024-07-25 NOTE — HISTORY OF PRESENT ILLNESS
[FreeTextEntry1] : Samuel is a 59 year old female presents for high-risk screening with a h/o left breast LCIS and ALH, s/p left excisional biopsy on 1/3/19. MILENA of 49%. Pt denies palpable masses, skin lesions/changes, nipple discharge. She reports intermittent left breast pain, which is self limiting; denies pain today.   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 The patient states that a sister was just diagnosed with thyroid cancer and recently underwent surgery, she is unsure if she underwent genetic testing.   Her OB/GYN, Dr. Palma.  She met with [facility] in the past to discuss risk reduction therapy and it she decided against AI.

## 2024-07-25 NOTE — ASSESSMENT
[FreeTextEntry1] : 60 yo female presents for high risk screening in the setting of a family history of breast cancer and a personal history of high risk lesions.   Reviewed the benign results of her MRI from last week, which was benign.   Discussed with the patient the implications of their lifetime risk, which is considered to be elevated for the development of breast cancer over the span of their lifetime. It was explained that it is recommended that they undergo high risk screening surveillance to include biannual radiological screening exams with a mammogram and screening MRI.   Discussed etiologies of breast pain including hormonal changes, scar tissue, benign entities such as cysts, reactive lymph nodes, musculoskeletal issues and rarely malignancy.  Will plan on annual mammogram in January & RTC after for reexamination.   Discussed the importance of breast self awareness. Encouraged the patient to become familiar with her tissue so as to be aware of any changes from her baseline.  Encouraged the patient to find out if her sister that was recently diagnosed with thyroid cancer has had any genetic testing completed.   All questions were answered; the patient verbalized understanding and is in agreement with the plan.

## 2024-07-25 NOTE — REVIEW OF SYSTEMS
[As Noted in HPI] : as noted in HPI [Breast Pain] : breast pain [Negative] : Heme/Lymph [Breast Lump] : no breast lump

## 2024-07-25 NOTE — PHYSICAL EXAM
[Normocephalic] : normocephalic [Supple] : supple [No Supraclavicular Adenopathy] : no supraclavicular adenopathy [Examined in the supine and seated position] : examined in the supine and seated position [Symmetrical] : symmetrical [No dominant masses] : no dominant masses in right breast  [No dominant masses] : no dominant masses left breast [No Nipple Retraction] : no left nipple retraction [No Nipple Discharge] : no left nipple discharge [No Axillary Lymphadenopathy] : no left axillary lymphadenopathy [No Edema] : no edema [No Rashes] : no rashes [No Ulceration] : no ulceration [Breast Nipple Inversion] : nipples not inverted [Breast Nipple Retraction] : nipples not retracted [Breast Nipple Flattening] : nipples not flattened [Breast Nipple Fissures] : nipples not fissured [de-identified] : circumareolar incision, well healed

## 2024-07-25 NOTE — DATA REVIEWED
[FreeTextEntry1] : --7/8/19 b/l mammo/US: no mammographic or sonographic evidence of malignancy, BIRADS-2 benign findings.   --8/15/19 ([facility]) MRI breasts: dense breast tissue. Right breast, 0.5 cm enhancing lesion at 1n2.7, likely benign but not seen on prior imaging so targeted US with biopsy is recommended, vs MRI biopsy. BI-RADS 4. Left breast benign MRI findings. Chest: 1.3 cm area in the right middle lobe, follow-up CT scan may be indicated.   --9/20/19 ([facility]) biopsy: right breast 1n2.7, benign breast tissue. Concordant, f/u MRI is recommended in one year.   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   -- 7/6/2020 ([facility]) b/l mmg: heterogenously dense breasts (of note-- previous was denoted scattered areas of fibroglandular density). No suspicious findings. BI-RADS 2.   -- 12/8/20 ([facility]) MRI breasts: RIGHT BREAST: 1. New 1.2 x 0.6 x 0.7 cm enhancing lesion 12:00 position, 3.5 cm from the nipple with indeterminate enhancement kinetics. Biopsy is recommended. Right breast mammogram and ultrasound are recommended to determine if it is amenable to ultrasound or stereotactic biopsy. Otherwise, MRI biopsy is recommended. 2. 0.4 cm subthreshold focus of non-mass enhancement at the 12:00 position, 4.5 cm from the nipple new from prior study. This can also be evaluated on mammogram and ultrasound. If it is negative, additional biopsies can be based on pathology. 3. 0.5 cm enhancing lesion at the 1:00 position, 2.7 cm from the nipple with indeterminate enhancement kinetics, delayed plateau type enhancement, unchanged in size but previously had benign enhancement kinetics. The microclip from previous biopsy is not associated with the lesion. Right breast mammogram and ultrasound are recommended to determine if it is amenable to ultrasound or stereotactic biopsy. Otherwise, MRI biopsy is recommended. LEFT BREAST: 0.5 cm enhancing lesion at the 5:30 axis, 4.5 cm from the nipple with benign enhancement kinetics, but new from prior study. Biopsy is recommended. Left breast mammogram and ultrasound are recommended to determine if it is amenable to ultrasound or stereotactic biopsy. Otherwise, MRI biopsy is recommended. BI-RADS 4B  -- 12/14/20 ([facility]) b/l mmg & US: No mammographic abnormalities are seen to correspond with the enhancing lesions seen on MRI. Bilateral MRI guided biopsies are recommended 2. 1.2 x 0.4 cm ill-defined mildly hypoechoic lesion 11-12:00 right breast, 3.5 cm from the nipple possibly corresponding with the enhancing lesion seen on MRI. However, it is better visualized on MRI and a 0.7 cm enhancing lesion 1:00 position also needs right breast MRI biopsy. Negative left breast ultrasound. No sonographic abnormalities seen to correspond with the 0.5 cm enhancing lesion at the 5:30 axis. Therefore, bilateral MRI biopsies recommended. BI-RADS 4B  -- 12/22/20 ([facility]) MRI biopsies: left 5:30 4.5cmFN benign breast tissue with fibroadenomatoid and fibrocystic changes, rare calcifications associated with benign epithelium. Right 12:00 3.5cmFN predominantly fatty tissue with rare skin adnexal glands, right 1:00 2.7cmFN predominantly fatty benign breast tissue with fibroadenoma   -- 12/22/20 ([facility]) MRI biopsies: left 5:30 4.5cmFN benign breast tissue with fibroadenomatoid and fibrocystic changes, rare calcifications associated with benign epithelium. Right 12:00 3.5cmFN predominantly fatty tissue with rare skin adnexal glands, right 1:00 2.7cmFN predominantly fatty benign breast tissue with fibroadenoma.   -- 6/1/21 ([facility]) B/l breast US:No sonographic evidence of malignancy. Please note, on addendum to MR guided biopsy performed 12/22/2020, patient was recommended to resume annual mammogram in December 2021 and follow-up with MR also to be scheduled for December 2021. BI-RADS 1.   12/27/2021 ([facility]) Mmg & US: scattered fbg density. 0.4cm mass in the right breast at 11:00 for which targeted US is recommended. BI-RADS 0.  1/5/22 ([facility]) right breast US: indeterminate finding in the right breast at 11:00. US bx is recommended. BI-RADS 4A.  1/14/22 ([facility]) right breast 11n3, US bx: non-proliferative change; sclerosing adenosis. Benign & concordant. Rec US in 1 year.   1/18/22 ([facility]) B/l screening mammo and US: scattered fbg density. No evidence of malignancy. BI-RADS 2.   7/5/23 ([facility]) MRI: No suspicious enhancement to warrant biopsy. BI-RADS 2  1/17/24 ([facility]) left US and b/l diag mammo: scattered areas of fibroglandular density. No mammographic or sonographic evidence of malignancy. Further management of pain should be based clinically. BI-RADS 2.   7/18/24 ([facility]) MRI: No MRI evidence of malignancy. BI-RADS 2.

## 2024-07-29 ENCOUNTER — TRANSCRIPTION ENCOUNTER (OUTPATIENT)
Age: 59
End: 2024-07-29

## 2024-09-27 ENCOUNTER — APPOINTMENT (OUTPATIENT)
Dept: BREAST CENTER | Facility: CLINIC | Age: 59
End: 2024-09-27
Payer: COMMERCIAL

## 2024-09-27 VITALS — HEIGHT: 62 IN | WEIGHT: 122 LBS | BODY MASS INDEX: 22.45 KG/M2

## 2024-09-27 DIAGNOSIS — Z91.89 OTHER SPECIFIED PERSONAL RISK FACTORS, NOT ELSEWHERE CLASSIFIED: ICD-10-CM

## 2024-09-27 DIAGNOSIS — N64.4 MASTODYNIA: ICD-10-CM

## 2024-09-27 PROCEDURE — 99213 OFFICE O/P EST LOW 20 MIN: CPT

## 2024-09-27 NOTE — HISTORY OF PRESENT ILLNESS
[FreeTextEntry1] : Samuel is a 59 year old female presents for acute exacerbation of chronic left breast pain.  The patient has a history of intermittent chronic left breast pain, which is usually self-limiting.  The patient notes that over the last month the pain became worse, felt different than prior.  Patient notes a particularly stressful month, her cat passed away, and was planning for her son's wedding shower.  The patient notes that the pain has been diminishing over the past week.  She denies palpable lump in that area, as well as nipple discharge, or skin changes.  She has a h/o left breast LCIS and ALH, s/p left excisional biopsy on 1/3/19. MILENA of 49%.   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 The patient's sister has a history of thyroid cancer.   Her OB/GYN, Dr. Palma.  She met with [facility] in the past to discuss risk reduction therapy and it she decided against AI.

## 2024-09-27 NOTE — PHYSICAL EXAM
[Normocephalic] : normocephalic [No Supraclavicular Adenopathy] : no supraclavicular adenopathy [Examined in the supine and seated position] : examined in the supine and seated position [Symmetrical] : symmetrical [No dominant masses] : no dominant masses in right breast  [No dominant masses] : no dominant masses left breast [No Nipple Retraction] : no left nipple retraction [No Nipple Discharge] : no left nipple discharge [No Axillary Lymphadenopathy] : no left axillary lymphadenopathy [No Edema] : no edema [No Rashes] : no rashes [No Ulceration] : no ulceration [No Cervical Adenopathy] : no cervical adenopathy [Breast Nipple Inversion] : nipples not inverted [Breast Nipple Retraction] : nipples not retracted [Breast Nipple Flattening] : nipples not flattened [Breast Nipple Fissures] : nipples not fissured [de-identified] : circumareolar incision, well healed

## 2024-09-27 NOTE — DATA REVIEWED
[FreeTextEntry1] : --7/8/19 b/l mammo/US: no mammographic or sonographic evidence of malignancy, BIRADS-2 benign findings.   --8/15/19 ([facility]) MRI breasts: dense breast tissue. Right breast, 0.5 cm enhancing lesion at 1n2.7, likely benign but not seen on prior imaging so targeted US with biopsy is recommended, vs MRI biopsy. BI-RADS 4. Left breast benign MRI findings. Chest: 1.3 cm area in the right middle lobe, follow-up CT scan may be indicated.   --9/20/19 ([facility]) biopsy: right breast 1n2.7, benign breast tissue. Concordant, f/u MRI is recommended in one year.   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   -- 7/6/2020 ([facility]) b/l mmg: heterogenously dense breasts (of note-- previous was denoted scattered areas of fibroglandular density). No suspicious findings. BI-RADS 2.   -- 12/8/20 ([facility]) MRI breasts: RIGHT BREAST: 1. New 1.2 x 0.6 x 0.7 cm enhancing lesion 12:00 position, 3.5 cm from the nipple with indeterminate enhancement kinetics. Biopsy is recommended. Right breast mammogram and ultrasound are recommended to determine if it is amenable to ultrasound or stereotactic biopsy. Otherwise, MRI biopsy is recommended. 2. 0.4 cm subthreshold focus of non-mass enhancement at the 12:00 position, 4.5 cm from the nipple new from prior study. This can also be evaluated on mammogram and ultrasound. If it is negative, additional biopsies can be based on pathology. 3. 0.5 cm enhancing lesion at the 1:00 position, 2.7 cm from the nipple with indeterminate enhancement kinetics, delayed plateau type enhancement, unchanged in size but previously had benign enhancement kinetics. The microclip from previous biopsy is not associated with the lesion. Right breast mammogram and ultrasound are recommended to determine if it is amenable to ultrasound or stereotactic biopsy. Otherwise, MRI biopsy is recommended. LEFT BREAST: 0.5 cm enhancing lesion at the 5:30 axis, 4.5 cm from the nipple with benign enhancement kinetics, but new from prior study. Biopsy is recommended. Left breast mammogram and ultrasound are recommended to determine if it is amenable to ultrasound or stereotactic biopsy. Otherwise, MRI biopsy is recommended. BI-RADS 4B  -- 12/14/20 ([facility]) b/l mmg & US: No mammographic abnormalities are seen to correspond with the enhancing lesions seen on MRI. Bilateral MRI guided biopsies are recommended 2. 1.2 x 0.4 cm ill-defined mildly hypoechoic lesion 11-12:00 right breast, 3.5 cm from the nipple possibly corresponding with the enhancing lesion seen on MRI. However, it is better visualized on MRI and a 0.7 cm enhancing lesion 1:00 position also needs right breast MRI biopsy. Negative left breast ultrasound. No sonographic abnormalities seen to correspond with the 0.5 cm enhancing lesion at the 5:30 axis. Therefore, bilateral MRI biopsies recommended. BI-RADS 4B  -- 12/22/20 ([facility]) MRI biopsies: left 5:30 4.5cmFN benign breast tissue with fibroadenomatoid and fibrocystic changes, rare calcifications associated with benign epithelium. Right 12:00 3.5cmFN predominantly fatty tissue with rare skin adnexal glands, right 1:00 2.7cmFN predominantly fatty benign breast tissue with fibroadenoma   -- 12/22/20 ([facility]) MRI biopsies: left 5:30 4.5cmFN benign breast tissue with fibroadenomatoid and fibrocystic changes, rare calcifications associated with benign epithelium. Right 12:00 3.5cmFN predominantly fatty tissue with rare skin adnexal glands, right 1:00 2.7cmFN predominantly fatty benign breast tissue with fibroadenoma.   -- 6/1/21 ([facility]) B/l breast US:No sonographic evidence of malignancy. Please note, on addendum to MR guided biopsy performed 12/22/2020, patient was recommended to resume annual mammogram in December 2021 and follow-up with MR also to be scheduled for December 2021. BI-RADS 1.   12/27/2021 ([facility]) Mmg & US: scattered fbg density. 0.4cm mass in the right breast at 11:00 for which targeted US is recommended. BI-RADS 0.  1/5/22 ([facility]) right breast US: indeterminate finding in the right breast at 11:00. US bx is recommended. BI-RADS 4A.  1/14/22 ([facility]) right breast 11n3, US bx: non-proliferative change; sclerosing adenosis. Benign & concordant. Rec US in 1 year.   1/18/22 ([facility]) B/l screening mammo and US: scattered fbg density. No evidence of malignancy. BI-RADS 2.   7/5/23 ([facility]) MRI: No suspicious enhancement to warrant biopsy. BI-RADS 2  1/17/24 ([facility]) left US and b/l diag mammo: scattered areas of fibroglandular density. No mammographic or sonographic evidence of malignancy. Further management of pain should be based clinically. BI-RADS 2.   7/18/24 ([facility]) MRI: No MRI evidence of malignancy. BI-RADS 2.

## 2024-11-07 ENCOUNTER — APPOINTMENT (OUTPATIENT)
Dept: FAMILY MEDICINE | Facility: CLINIC | Age: 59
End: 2024-11-07

## 2024-11-07 ENCOUNTER — APPOINTMENT (OUTPATIENT)
Dept: HEART AND VASCULAR | Facility: CLINIC | Age: 59
End: 2024-11-07
Payer: COMMERCIAL

## 2024-11-07 ENCOUNTER — NON-APPOINTMENT (OUTPATIENT)
Age: 59
End: 2024-11-07

## 2024-11-07 VITALS
DIASTOLIC BLOOD PRESSURE: 78 MMHG | TEMPERATURE: 97.4 F | HEIGHT: 62 IN | OXYGEN SATURATION: 98 % | BODY MASS INDEX: 23.61 KG/M2 | SYSTOLIC BLOOD PRESSURE: 117 MMHG | HEART RATE: 59 BPM | WEIGHT: 128.31 LBS

## 2024-11-07 DIAGNOSIS — R07.2 PRECORDIAL PAIN: ICD-10-CM

## 2024-11-07 DIAGNOSIS — R94.31 ABNORMAL ELECTROCARDIOGRAM [ECG] [EKG]: ICD-10-CM

## 2024-11-07 PROCEDURE — G2211 COMPLEX E/M VISIT ADD ON: CPT | Mod: NC

## 2024-11-07 PROCEDURE — 99203 OFFICE O/P NEW LOW 30 MIN: CPT

## 2024-11-08 PROBLEM — R07.2 PRECORDIAL PAIN: Status: ACTIVE | Noted: 2024-11-08

## 2024-11-14 ENCOUNTER — APPOINTMENT (OUTPATIENT)
Dept: PHYSICAL MEDICINE AND REHAB | Facility: CLINIC | Age: 59
End: 2024-11-14

## 2024-11-15 ENCOUNTER — APPOINTMENT (OUTPATIENT)
Dept: PULMONOLOGY | Facility: CLINIC | Age: 59
End: 2024-11-15
Payer: COMMERCIAL

## 2024-11-15 ENCOUNTER — LABORATORY RESULT (OUTPATIENT)
Age: 59
End: 2024-11-15

## 2024-11-15 ENCOUNTER — NON-APPOINTMENT (OUTPATIENT)
Age: 59
End: 2024-11-15

## 2024-11-15 VITALS
HEIGHT: 62 IN | BODY MASS INDEX: 22.08 KG/M2 | RESPIRATION RATE: 12 BRPM | HEART RATE: 68 BPM | WEIGHT: 120 LBS | TEMPERATURE: 98.1 F | SYSTOLIC BLOOD PRESSURE: 145 MMHG | OXYGEN SATURATION: 97 % | DIASTOLIC BLOOD PRESSURE: 81 MMHG

## 2024-11-15 DIAGNOSIS — R06.02 SHORTNESS OF BREATH: ICD-10-CM

## 2024-11-15 DIAGNOSIS — R04.2 HEMOPTYSIS: ICD-10-CM

## 2024-11-15 DIAGNOSIS — J47.9 BRONCHIECTASIS, UNCOMPLICATED: ICD-10-CM

## 2024-11-15 PROCEDURE — G2211 COMPLEX E/M VISIT ADD ON: CPT | Mod: NC

## 2024-11-15 PROCEDURE — 99204 OFFICE O/P NEW MOD 45 MIN: CPT

## 2024-11-15 PROCEDURE — 99214 OFFICE O/P EST MOD 30 MIN: CPT

## 2024-11-18 LAB
A ALTERNATA IGE QN: <0.1 KUA/L
A FUMIGATUS IGE QN: <0.1 KUA/L
A1AT SERPL-MCNC: 131 MG/DL
ANA SER IF-ACNC: NEGATIVE
C ALBICANS IGE QN: <0.1 KUA/L
C HERBARUM IGE QN: <0.1 KUA/L
CAT DANDER IGE QN: 0.29 KUA/L
CCP AB SER IA-ACNC: <8 U/ML
COMMON RAGWEED IGE QN: <0.1 KUA/L
D FARINAE IGE QN: 0.15 KUA/L
D PTERONYSS IGE QN: 0.15 KUA/L
DEPRECATED A ALTERNATA IGE RAST QL: 0
DEPRECATED A FUMIGATUS IGE RAST QL: 0
DEPRECATED C ALBICANS IGE RAST QL: 0
DEPRECATED C HERBARUM IGE RAST QL: 0
DEPRECATED CAT DANDER IGE RAST QL: NORMAL
DEPRECATED COMMON RAGWEED IGE RAST QL: 0
DEPRECATED D FARINAE IGE RAST QL: NORMAL
DEPRECATED D PTERONYSS IGE RAST QL: NORMAL
DEPRECATED DOG DANDER IGE RAST QL: 0
DEPRECATED KAPPA LC FREE/LAMBDA SER: 1.06 RATIO
DEPRECATED M RACEMOSUS IGE RAST QL: 0
DEPRECATED ROACH IGE RAST QL: 0
DEPRECATED TIMOTHY IGE RAST QL: 0
DEPRECATED WHITE OAK IGE RAST QL: 0
DOG DANDER IGE QN: <0.1 KUA/L
ENA SS-A AB SER IA-ACNC: <0.2 AL
ENA SS-B AB SER IA-ACNC: <0.2 AL
EOSINOPHIL # BLD MANUAL: 130 /UL
IGA SER QL IEP: 360 MG/DL
IGG SER QL IEP: 1457 MG/DL
IGM SER QL IEP: 90 MG/DL
KAPPA LC CSF-MCNC: 2.24 MG/DL
KAPPA LC SERPL-MCNC: 2.37 MG/DL
M RACEMOSUS IGE QN: <0.1 KUA/L
RF+CCP IGG SER-IMP: NEGATIVE
RHEUMATOID FACT SER QL: 10 IU/ML
ROACH IGE QN: <0.1 KUA/L
TIMOTHY IGE QN: <0.1 KUA/L
TOTAL IGE SMQN RAST: 105 KU/L
WHITE OAK IGE QN: <0.1 KUA/L

## 2024-11-19 LAB
ALBUMIN MFR SERPL ELPH: 59.3 %
ALBUMIN SERPL-MCNC: 4.9 G/DL
ALBUMIN/GLOB SERPL: 1.5 RATIO
ALPHA1 GLOB MFR SERPL ELPH: 3.6 %
ALPHA1 GLOB SERPL ELPH-MCNC: 0.3 G/DL
ALPHA2 GLOB MFR SERPL ELPH: 8.3 %
ALPHA2 GLOB SERPL ELPH-MCNC: 0.7 G/DL
B-GLOBULIN MFR SERPL ELPH: 11.5 %
B-GLOBULIN SERPL ELPH-MCNC: 0.9 G/DL
GAMMA GLOB FLD ELPH-MCNC: 1.4 G/DL
GAMMA GLOB MFR SERPL ELPH: 17.3 %
INTERPRETATION SERPL IEP-IMP: NORMAL
PROT SERPL-MCNC: 8.2 G/DL
PROT SERPL-MCNC: 8.2 G/DL

## 2024-11-20 LAB
A FLAVUS AB FLD QL: NEGATIVE
A FUMIGATUS AB FLD QL: NEGATIVE
A NIGER AB FLD QL: NEGATIVE
M TB IFN-G BLD-IMP: NEGATIVE
QUANTIFERON TB PLUS MITOGEN MINUS NIL: >10 IU/ML
QUANTIFERON TB PLUS NIL: 0.08 IU/ML
QUANTIFERON TB PLUS TB1 MINUS NIL: 0 IU/ML
QUANTIFERON TB PLUS TB2 MINUS NIL: 0.02 IU/ML

## 2024-11-21 ENCOUNTER — APPOINTMENT (OUTPATIENT)
Dept: PULMONOLOGY | Facility: CLINIC | Age: 59
End: 2024-11-21

## 2024-11-21 LAB — GALACTOMANNAN AG SERPL QL IA: 0.04 INDEX

## 2024-11-25 ENCOUNTER — APPOINTMENT (OUTPATIENT)
Dept: NEUROLOGY | Facility: CLINIC | Age: 59
End: 2024-11-25
Payer: COMMERCIAL

## 2024-11-25 VITALS
TEMPERATURE: 98.1 F | OXYGEN SATURATION: 98 % | HEIGHT: 62 IN | BODY MASS INDEX: 22.45 KG/M2 | WEIGHT: 122 LBS | HEART RATE: 57 BPM | SYSTOLIC BLOOD PRESSURE: 144 MMHG | DIASTOLIC BLOOD PRESSURE: 84 MMHG

## 2024-11-25 DIAGNOSIS — G44.309 POST-TRAUMATIC HEADACHE, UNSPECIFIED, NOT INTRACTABLE: ICD-10-CM

## 2024-11-25 DIAGNOSIS — S09.90XA UNSPECIFIED INJURY OF HEAD, INITIAL ENCOUNTER: ICD-10-CM

## 2024-11-25 PROCEDURE — G2211 COMPLEX E/M VISIT ADD ON: CPT | Mod: NC

## 2024-11-25 PROCEDURE — 99204 OFFICE O/P NEW MOD 45 MIN: CPT

## 2024-12-02 ENCOUNTER — OUTPATIENT (OUTPATIENT)
Dept: OUTPATIENT SERVICES | Facility: HOSPITAL | Age: 59
LOS: 1 days | End: 2024-12-02
Payer: COMMERCIAL

## 2024-12-02 ENCOUNTER — APPOINTMENT (OUTPATIENT)
Dept: MRI IMAGING | Facility: CLINIC | Age: 59
End: 2024-12-02
Payer: COMMERCIAL

## 2024-12-02 DIAGNOSIS — S62.102A FRACTURE OF UNSPECIFIED CARPAL BONE, LEFT WRIST, INITIAL ENCOUNTER FOR CLOSED FRACTURE: Chronic | ICD-10-CM

## 2024-12-02 DIAGNOSIS — G44.309 POST-TRAUMATIC HEADACHE, UNSPECIFIED, NOT INTRACTABLE: ICD-10-CM

## 2024-12-02 DIAGNOSIS — S09.90XA UNSPECIFIED INJURY OF HEAD, INITIAL ENCOUNTER: ICD-10-CM

## 2024-12-02 PROCEDURE — 70551 MRI BRAIN STEM W/O DYE: CPT

## 2024-12-02 PROCEDURE — 70551 MRI BRAIN STEM W/O DYE: CPT | Mod: 26

## 2024-12-03 ENCOUNTER — NON-APPOINTMENT (OUTPATIENT)
Age: 59
End: 2024-12-03

## 2024-12-08 ENCOUNTER — OUTPATIENT (OUTPATIENT)
Dept: OUTPATIENT SERVICES | Facility: HOSPITAL | Age: 59
LOS: 1 days | End: 2024-12-08
Payer: COMMERCIAL

## 2024-12-08 DIAGNOSIS — S62.102A FRACTURE OF UNSPECIFIED CARPAL BONE, LEFT WRIST, INITIAL ENCOUNTER FOR CLOSED FRACTURE: Chronic | ICD-10-CM

## 2024-12-08 PROCEDURE — 71250 CT THORAX DX C-: CPT

## 2024-12-09 ENCOUNTER — APPOINTMENT (OUTPATIENT)
Dept: CT IMAGING | Facility: CLINIC | Age: 59
End: 2024-12-09

## 2024-12-10 ENCOUNTER — APPOINTMENT (OUTPATIENT)
Dept: GASTROENTEROLOGY | Facility: CLINIC | Age: 59
End: 2024-12-10
Payer: COMMERCIAL

## 2024-12-10 VITALS
BODY MASS INDEX: 22.26 KG/M2 | RESPIRATION RATE: 14 BRPM | DIASTOLIC BLOOD PRESSURE: 67 MMHG | OXYGEN SATURATION: 97 % | WEIGHT: 121 LBS | HEART RATE: 63 BPM | HEIGHT: 62 IN | TEMPERATURE: 97.4 F | SYSTOLIC BLOOD PRESSURE: 107 MMHG

## 2024-12-10 DIAGNOSIS — K64.4 RESIDUAL HEMORRHOIDAL SKIN TAGS: ICD-10-CM

## 2024-12-10 DIAGNOSIS — K59.09 OTHER CONSTIPATION: ICD-10-CM

## 2024-12-10 DIAGNOSIS — K64.8 OTHER HEMORRHOIDS: ICD-10-CM

## 2024-12-10 PROCEDURE — G2211 COMPLEX E/M VISIT ADD ON: CPT | Mod: NC

## 2024-12-10 PROCEDURE — 99205 OFFICE O/P NEW HI 60 MIN: CPT

## 2024-12-10 RX ORDER — HYDROCORTISONE 25 MG/G
2.5 CREAM TOPICAL
Qty: 1 | Refills: 0 | Status: ACTIVE | COMMUNITY
Start: 2024-12-10 | End: 1900-01-01

## 2024-12-23 ENCOUNTER — APPOINTMENT (OUTPATIENT)
Dept: NEUROLOGY | Facility: CLINIC | Age: 59
End: 2024-12-23
Payer: COMMERCIAL

## 2024-12-23 VITALS
HEART RATE: 66 BPM | OXYGEN SATURATION: 99 % | BODY MASS INDEX: 22.26 KG/M2 | DIASTOLIC BLOOD PRESSURE: 80 MMHG | WEIGHT: 121 LBS | HEIGHT: 62 IN | SYSTOLIC BLOOD PRESSURE: 132 MMHG | TEMPERATURE: 98.1 F

## 2024-12-23 DIAGNOSIS — E78.5 HYPERLIPIDEMIA, UNSPECIFIED: ICD-10-CM

## 2024-12-23 DIAGNOSIS — R90.82 WHITE MATTER DISEASE, UNSPECIFIED: ICD-10-CM

## 2024-12-23 DIAGNOSIS — R90.89 OTHER ABNORMAL FINDINGS ON DIAGNOSTIC IMAGING OF CENTRAL NERVOUS SYSTEM: ICD-10-CM

## 2024-12-23 DIAGNOSIS — S09.90XA UNSPECIFIED INJURY OF HEAD, INITIAL ENCOUNTER: ICD-10-CM

## 2024-12-23 DIAGNOSIS — G44.309 POST-TRAUMATIC HEADACHE, UNSPECIFIED, NOT INTRACTABLE: ICD-10-CM

## 2024-12-23 PROCEDURE — G2211 COMPLEX E/M VISIT ADD ON: CPT | Mod: NC

## 2024-12-23 PROCEDURE — 99214 OFFICE O/P EST MOD 30 MIN: CPT

## 2025-01-02 ENCOUNTER — APPOINTMENT (OUTPATIENT)
Dept: HEART AND VASCULAR | Facility: CLINIC | Age: 60
End: 2025-01-02
Payer: COMMERCIAL

## 2025-01-02 ENCOUNTER — APPOINTMENT (OUTPATIENT)
Dept: COLORECTAL SURGERY | Facility: CLINIC | Age: 60
End: 2025-01-02

## 2025-01-02 DIAGNOSIS — R94.31 ABNORMAL ELECTROCARDIOGRAM [ECG] [EKG]: ICD-10-CM

## 2025-01-02 PROCEDURE — 93306 TTE W/DOPPLER COMPLETE: CPT

## 2025-01-15 ENCOUNTER — APPOINTMENT (OUTPATIENT)
Dept: PULMONOLOGY | Facility: CLINIC | Age: 60
End: 2025-01-15

## 2025-01-24 ENCOUNTER — APPOINTMENT (OUTPATIENT)
Dept: PULMONOLOGY | Facility: CLINIC | Age: 60
End: 2025-01-24

## 2025-01-27 ENCOUNTER — NON-APPOINTMENT (OUTPATIENT)
Age: 60
End: 2025-01-27

## 2025-01-27 ENCOUNTER — APPOINTMENT (OUTPATIENT)
Dept: PULMONOLOGY | Facility: CLINIC | Age: 60
End: 2025-01-27
Payer: COMMERCIAL

## 2025-01-27 VITALS
WEIGHT: 123 LBS | RESPIRATION RATE: 15 BRPM | SYSTOLIC BLOOD PRESSURE: 152 MMHG | BODY MASS INDEX: 22.63 KG/M2 | OXYGEN SATURATION: 96 % | HEIGHT: 62 IN | DIASTOLIC BLOOD PRESSURE: 79 MMHG | TEMPERATURE: 97.2 F | HEART RATE: 65 BPM

## 2025-01-27 DIAGNOSIS — J47.9 BRONCHIECTASIS, UNCOMPLICATED: ICD-10-CM

## 2025-01-27 DIAGNOSIS — R05.3 CHRONIC COUGH: ICD-10-CM

## 2025-01-27 DIAGNOSIS — R04.2 HEMOPTYSIS: ICD-10-CM

## 2025-01-27 PROCEDURE — 99214 OFFICE O/P EST MOD 30 MIN: CPT

## 2025-01-27 PROCEDURE — G2211 COMPLEX E/M VISIT ADD ON: CPT | Mod: NC

## 2025-01-27 RX ORDER — IPRATROPIUM BROMIDE AND ALBUTEROL SULFATE 2.5; .5 MG/3ML; MG/3ML
0.5-2.5 (3) SOLUTION RESPIRATORY (INHALATION)
Qty: 1 | Refills: 6 | Status: ACTIVE | COMMUNITY
Start: 2025-01-27 | End: 1900-01-01

## 2025-01-27 RX ORDER — SODIUM CHLORIDE FOR INHALATION 3 %
3 VIAL, NEBULIZER (ML) INHALATION DAILY
Qty: 1 | Refills: 6 | Status: ACTIVE | COMMUNITY
Start: 2025-01-27 | End: 1900-01-01

## 2025-01-28 ENCOUNTER — APPOINTMENT (OUTPATIENT)
Dept: HEART AND VASCULAR | Facility: CLINIC | Age: 60
End: 2025-01-28

## 2025-01-28 PROBLEM — R05.3 CHRONIC COUGH: Status: ACTIVE | Noted: 2025-01-28

## 2025-01-30 ENCOUNTER — APPOINTMENT (OUTPATIENT)
Dept: BREAST CENTER | Facility: CLINIC | Age: 60
End: 2025-01-30

## 2025-03-18 ENCOUNTER — APPOINTMENT (OUTPATIENT)
Dept: NEUROLOGY | Facility: CLINIC | Age: 60
End: 2025-03-18

## 2025-03-21 ENCOUNTER — APPOINTMENT (OUTPATIENT)
Dept: FAMILY MEDICINE | Facility: CLINIC | Age: 60
End: 2025-03-21
Payer: COMMERCIAL

## 2025-03-21 VITALS
BODY MASS INDEX: 22.78 KG/M2 | TEMPERATURE: 97.8 F | SYSTOLIC BLOOD PRESSURE: 130 MMHG | WEIGHT: 123.8 LBS | HEART RATE: 59 BPM | OXYGEN SATURATION: 99 % | HEIGHT: 62 IN | DIASTOLIC BLOOD PRESSURE: 82 MMHG

## 2025-03-21 DIAGNOSIS — Z13.0 ENCOUNTER FOR SCREENING FOR OTHER SUSPECTED ENDOCRINE DISORDER: ICD-10-CM

## 2025-03-21 DIAGNOSIS — Z13.228 ENCOUNTER FOR SCREENING FOR OTHER SUSPECTED ENDOCRINE DISORDER: ICD-10-CM

## 2025-03-21 DIAGNOSIS — Z13.0 ENCOUNTER FOR SCREENING FOR DISEASES OF THE BLOOD AND BLOOD-FORMING ORGANS AND CERTAIN DISORDERS INVOLVING THE IMMUNE MECHANISM: ICD-10-CM

## 2025-03-21 DIAGNOSIS — M81.0 AGE-RELATED OSTEOPOROSIS W/OUT CURRENT PATHOLOGICAL FRACTURE: ICD-10-CM

## 2025-03-21 DIAGNOSIS — Z13.29 ENCOUNTER FOR SCREENING FOR OTHER SUSPECTED ENDOCRINE DISORDER: ICD-10-CM

## 2025-03-21 DIAGNOSIS — E78.5 HYPERLIPIDEMIA, UNSPECIFIED: ICD-10-CM

## 2025-03-21 PROCEDURE — 36415 COLL VENOUS BLD VENIPUNCTURE: CPT

## 2025-03-21 PROCEDURE — G2211 COMPLEX E/M VISIT ADD ON: CPT | Mod: NC

## 2025-03-21 PROCEDURE — 99214 OFFICE O/P EST MOD 30 MIN: CPT

## 2025-03-24 LAB
25(OH)D3 SERPL-MCNC: 52.5 NG/ML
ALBUMIN SERPL ELPH-MCNC: 4.3 G/DL
ALP BLD-CCNC: 99 U/L
ALT SERPL-CCNC: 21 U/L
ANION GAP SERPL CALC-SCNC: 11 MMOL/L
AST SERPL-CCNC: 27 U/L
BASOPHILS # BLD AUTO: 0.04 K/UL
BASOPHILS NFR BLD AUTO: 0.7 %
BILIRUB SERPL-MCNC: 0.6 MG/DL
BUN SERPL-MCNC: 11 MG/DL
CALCIUM SERPL-MCNC: 9.9 MG/DL
CHLORIDE SERPL-SCNC: 104 MMOL/L
CHOLEST SERPL-MCNC: 213 MG/DL
CO2 SERPL-SCNC: 27 MMOL/L
CREAT SERPL-MCNC: 0.86 MG/DL
EGFRCR SERPLBLD CKD-EPI 2021: 78 ML/MIN/1.73M2
EOSINOPHIL # BLD AUTO: 0.31 K/UL
EOSINOPHIL NFR BLD AUTO: 5.7 %
ESTIMATED AVERAGE GLUCOSE: 105 MG/DL
GLUCOSE SERPL-MCNC: 88 MG/DL
HBA1C MFR BLD HPLC: 5.3 %
HCT VFR BLD CALC: 44.7 %
HDLC SERPL-MCNC: 55 MG/DL
HGB BLD-MCNC: 14.8 G/DL
IMM GRANULOCYTES NFR BLD AUTO: 0.2 %
LDLC SERPL-MCNC: 127 MG/DL
LYMPHOCYTES # BLD AUTO: 1.46 K/UL
LYMPHOCYTES NFR BLD AUTO: 26.8 %
MAN DIFF?: NORMAL
MCHC RBC-ENTMCNC: 30.3 PG
MCHC RBC-ENTMCNC: 33.1 G/DL
MCV RBC AUTO: 91.4 FL
MONOCYTES # BLD AUTO: 0.45 K/UL
MONOCYTES NFR BLD AUTO: 8.3 %
NEUTROPHILS # BLD AUTO: 3.17 K/UL
NEUTROPHILS NFR BLD AUTO: 58.3 %
NONHDLC SERPL-MCNC: 158 MG/DL
PLATELET # BLD AUTO: 277 K/UL
POTASSIUM SERPL-SCNC: 4.3 MMOL/L
PROT SERPL-MCNC: 7.4 G/DL
RBC # BLD: 4.89 M/UL
RBC # FLD: 12.6 %
SODIUM SERPL-SCNC: 142 MMOL/L
TRIGL SERPL-MCNC: 181 MG/DL
TSH SERPL-ACNC: 1.45 UIU/ML
WBC # FLD AUTO: 5.44 K/UL

## 2025-03-25 ENCOUNTER — OUTPATIENT (OUTPATIENT)
Dept: OUTPATIENT SERVICES | Facility: HOSPITAL | Age: 60
LOS: 1 days | End: 2025-03-25
Payer: COMMERCIAL

## 2025-03-25 ENCOUNTER — APPOINTMENT (OUTPATIENT)
Dept: MAMMOGRAPHY | Facility: IMAGING CENTER | Age: 60
End: 2025-03-25

## 2025-03-25 ENCOUNTER — APPOINTMENT (OUTPATIENT)
Dept: RADIOLOGY | Facility: IMAGING CENTER | Age: 60
End: 2025-03-25
Payer: COMMERCIAL

## 2025-03-25 ENCOUNTER — LABORATORY RESULT (OUTPATIENT)
Age: 60
End: 2025-03-25

## 2025-03-25 ENCOUNTER — RESULT REVIEW (OUTPATIENT)
Age: 60
End: 2025-03-25

## 2025-03-25 DIAGNOSIS — S62.102A FRACTURE OF UNSPECIFIED CARPAL BONE, LEFT WRIST, INITIAL ENCOUNTER FOR CLOSED FRACTURE: Chronic | ICD-10-CM

## 2025-03-25 DIAGNOSIS — M81.0 AGE-RELATED OSTEOPOROSIS WITHOUT CURRENT PATHOLOGICAL FRACTURE: ICD-10-CM

## 2025-03-25 PROCEDURE — 77067 SCR MAMMO BI INCL CAD: CPT | Mod: 26

## 2025-03-25 PROCEDURE — 77080 DXA BONE DENSITY AXIAL: CPT

## 2025-03-25 PROCEDURE — 77063 BREAST TOMOSYNTHESIS BI: CPT | Mod: 26

## 2025-03-25 PROCEDURE — 77080 DXA BONE DENSITY AXIAL: CPT | Mod: 26

## 2025-03-25 PROCEDURE — 77063 BREAST TOMOSYNTHESIS BI: CPT

## 2025-03-25 PROCEDURE — 77067 SCR MAMMO BI INCL CAD: CPT

## 2025-03-26 ENCOUNTER — TRANSCRIPTION ENCOUNTER (OUTPATIENT)
Age: 60
End: 2025-03-26

## 2025-04-01 ENCOUNTER — APPOINTMENT (OUTPATIENT)
Dept: HEART AND VASCULAR | Facility: CLINIC | Age: 60
End: 2025-04-01
Payer: COMMERCIAL

## 2025-04-01 VITALS
SYSTOLIC BLOOD PRESSURE: 116 MMHG | DIASTOLIC BLOOD PRESSURE: 77 MMHG | HEIGHT: 62 IN | OXYGEN SATURATION: 96 % | TEMPERATURE: 97.7 F | WEIGHT: 125.8 LBS | BODY MASS INDEX: 23.15 KG/M2 | HEART RATE: 67 BPM

## 2025-04-01 DIAGNOSIS — R94.31 ABNORMAL ELECTROCARDIOGRAM [ECG] [EKG]: ICD-10-CM

## 2025-04-01 DIAGNOSIS — R06.02 SHORTNESS OF BREATH: ICD-10-CM

## 2025-04-01 DIAGNOSIS — E78.5 HYPERLIPIDEMIA, UNSPECIFIED: ICD-10-CM

## 2025-04-01 PROCEDURE — 93015 CV STRESS TEST SUPVJ I&R: CPT

## 2025-04-01 PROCEDURE — 99214 OFFICE O/P EST MOD 30 MIN: CPT | Mod: 25

## 2025-04-28 ENCOUNTER — APPOINTMENT (OUTPATIENT)
Dept: PULMONOLOGY | Facility: CLINIC | Age: 60
End: 2025-04-28

## 2025-05-05 ENCOUNTER — APPOINTMENT (OUTPATIENT)
Dept: PULMONOLOGY | Facility: CLINIC | Age: 60
End: 2025-05-05
Payer: COMMERCIAL

## 2025-05-05 VITALS
RESPIRATION RATE: 12 BRPM | OXYGEN SATURATION: 96 % | DIASTOLIC BLOOD PRESSURE: 80 MMHG | BODY MASS INDEX: 22.63 KG/M2 | HEIGHT: 62 IN | TEMPERATURE: 97.7 F | WEIGHT: 123 LBS | HEART RATE: 67 BPM | SYSTOLIC BLOOD PRESSURE: 121 MMHG

## 2025-05-05 DIAGNOSIS — R05.3 CHRONIC COUGH: ICD-10-CM

## 2025-05-05 DIAGNOSIS — J47.9 BRONCHIECTASIS, UNCOMPLICATED: ICD-10-CM

## 2025-05-05 DIAGNOSIS — A31.0 PULMONARY MYCOBACTERIAL INFECTION: ICD-10-CM

## 2025-05-05 PROCEDURE — G2211 COMPLEX E/M VISIT ADD ON: CPT | Mod: NC

## 2025-05-05 PROCEDURE — 99214 OFFICE O/P EST MOD 30 MIN: CPT

## 2025-05-06 PROBLEM — A31.0 MAI (MYCOBACTERIUM AVIUM-INTRACELLULARE): Status: ACTIVE | Noted: 2025-05-06

## 2025-08-16 ENCOUNTER — APPOINTMENT (OUTPATIENT)
Dept: HEART AND VASCULAR | Facility: CLINIC | Age: 60
End: 2025-08-16

## 2025-09-02 ENCOUNTER — APPOINTMENT (OUTPATIENT)
Dept: HEART AND VASCULAR | Facility: CLINIC | Age: 60
End: 2025-09-02